# Patient Record
Sex: FEMALE | Race: WHITE | NOT HISPANIC OR LATINO
[De-identification: names, ages, dates, MRNs, and addresses within clinical notes are randomized per-mention and may not be internally consistent; named-entity substitution may affect disease eponyms.]

---

## 2017-02-27 ENCOUNTER — RX RENEWAL (OUTPATIENT)
Age: 42
End: 2017-02-27

## 2017-04-12 ENCOUNTER — APPOINTMENT (OUTPATIENT)
Dept: INTERNAL MEDICINE | Facility: CLINIC | Age: 42
End: 2017-04-12

## 2017-04-12 VITALS
BODY MASS INDEX: 19.14 KG/M2 | SYSTOLIC BLOOD PRESSURE: 110 MMHG | WEIGHT: 108 LBS | HEIGHT: 63 IN | DIASTOLIC BLOOD PRESSURE: 80 MMHG

## 2017-04-12 DIAGNOSIS — Z87.09 PERSONAL HISTORY OF OTHER DISEASES OF THE RESPIRATORY SYSTEM: ICD-10-CM

## 2017-04-12 DIAGNOSIS — L91.0 HYPERTROPHIC SCAR: ICD-10-CM

## 2017-04-13 LAB
ALBUMIN SERPL ELPH-MCNC: 4.4 G/DL
ALP BLD-CCNC: 49 U/L
ALT SERPL-CCNC: 13 U/L
ANION GAP SERPL CALC-SCNC: 12 MMOL/L
AST SERPL-CCNC: 19 U/L
BASOPHILS # BLD AUTO: 0.03 K/UL
BASOPHILS NFR BLD AUTO: 0.5 %
BILIRUB SERPL-MCNC: 0.2 MG/DL
BUN SERPL-MCNC: 11 MG/DL
CALCIUM SERPL-MCNC: 9.6 MG/DL
CHLORIDE SERPL-SCNC: 102 MMOL/L
CO2 SERPL-SCNC: 27 MMOL/L
CREAT SERPL-MCNC: 0.84 MG/DL
EOSINOPHIL # BLD AUTO: 0.09 K/UL
EOSINOPHIL NFR BLD AUTO: 1.5 %
GLUCOSE SERPL-MCNC: 86 MG/DL
HCT VFR BLD CALC: 38.8 %
HGB BLD-MCNC: 12.1 G/DL
IMM GRANULOCYTES NFR BLD AUTO: 0.2 %
IRON SATN MFR SERPL: 7 %
IRON SERPL-MCNC: 31 UG/DL
LYMPHOCYTES # BLD AUTO: 1.63 K/UL
LYMPHOCYTES NFR BLD AUTO: 27 %
MAN DIFF?: NORMAL
MCHC RBC-ENTMCNC: 26.5 PG
MCHC RBC-ENTMCNC: 31.2 GM/DL
MCV RBC AUTO: 85.1 FL
MONOCYTES # BLD AUTO: 0.43 K/UL
MONOCYTES NFR BLD AUTO: 7.1 %
NEUTROPHILS # BLD AUTO: 3.84 K/UL
NEUTROPHILS NFR BLD AUTO: 63.7 %
PLATELET # BLD AUTO: 287 K/UL
POTASSIUM SERPL-SCNC: 4.8 MMOL/L
PROT SERPL-MCNC: 6.8 G/DL
RBC # BLD: 4.56 M/UL
RBC # FLD: 14 %
SODIUM SERPL-SCNC: 141 MMOL/L
TIBC SERPL-MCNC: 439 UG/DL
TSH SERPL-ACNC: 1.96 UIU/ML
UIBC SERPL-MCNC: 408 UG/DL
WBC # FLD AUTO: 6.03 K/UL

## 2017-06-09 ENCOUNTER — APPOINTMENT (OUTPATIENT)
Dept: DERMATOLOGY | Facility: CLINIC | Age: 42
End: 2017-06-09

## 2017-06-09 VITALS
DIASTOLIC BLOOD PRESSURE: 70 MMHG | SYSTOLIC BLOOD PRESSURE: 104 MMHG | HEIGHT: 63 IN | BODY MASS INDEX: 19.14 KG/M2 | WEIGHT: 108 LBS

## 2017-06-09 DIAGNOSIS — Z87.442 PERSONAL HISTORY OF URINARY CALCULI: ICD-10-CM

## 2017-06-09 DIAGNOSIS — H54.7 UNSPECIFIED VISUAL LOSS: ICD-10-CM

## 2017-06-09 DIAGNOSIS — Z78.9 OTHER SPECIFIED HEALTH STATUS: ICD-10-CM

## 2017-06-09 DIAGNOSIS — I78.1 NEVUS, NON-NEOPLASTIC: ICD-10-CM

## 2017-06-09 DIAGNOSIS — J30.1 ALLERGIC RHINITIS DUE TO POLLEN: ICD-10-CM

## 2017-06-09 DIAGNOSIS — Z12.83 ENCOUNTER FOR SCREENING FOR MALIGNANT NEOPLASM OF SKIN: ICD-10-CM

## 2017-06-09 DIAGNOSIS — L91.0 HYPERTROPHIC SCAR: ICD-10-CM

## 2017-06-09 RX ORDER — FLUTICASONE PROPIONATE 50 MCG
SPRAY, SUSPENSION NASAL
Refills: 0 | Status: ACTIVE | COMMUNITY

## 2017-06-20 ENCOUNTER — FORM ENCOUNTER (OUTPATIENT)
Age: 42
End: 2017-06-20

## 2017-06-21 ENCOUNTER — APPOINTMENT (OUTPATIENT)
Dept: MAMMOGRAPHY | Facility: IMAGING CENTER | Age: 42
End: 2017-06-21

## 2017-06-21 ENCOUNTER — APPOINTMENT (OUTPATIENT)
Dept: ULTRASOUND IMAGING | Facility: IMAGING CENTER | Age: 42
End: 2017-06-21

## 2017-06-21 ENCOUNTER — OUTPATIENT (OUTPATIENT)
Dept: OUTPATIENT SERVICES | Facility: HOSPITAL | Age: 42
LOS: 1 days | End: 2017-06-21
Payer: COMMERCIAL

## 2017-06-21 DIAGNOSIS — Z98.89 OTHER SPECIFIED POSTPROCEDURAL STATES: Chronic | ICD-10-CM

## 2017-06-21 DIAGNOSIS — Z00.8 ENCOUNTER FOR OTHER GENERAL EXAMINATION: ICD-10-CM

## 2017-06-21 PROCEDURE — 76641 ULTRASOUND BREAST COMPLETE: CPT

## 2017-06-21 PROCEDURE — 77067 SCR MAMMO BI INCL CAD: CPT

## 2017-06-21 PROCEDURE — 77063 BREAST TOMOSYNTHESIS BI: CPT

## 2017-06-26 DIAGNOSIS — N60.11 DIFFUSE CYSTIC MASTOPATHY OF RIGHT BREAST: ICD-10-CM

## 2017-06-26 DIAGNOSIS — N60.12 DIFFUSE CYSTIC MASTOPATHY OF LEFT BREAST: ICD-10-CM

## 2017-06-26 DIAGNOSIS — Z12.31 ENCOUNTER FOR SCREENING MAMMOGRAM FOR MALIGNANT NEOPLASM OF BREAST: ICD-10-CM

## 2017-06-26 DIAGNOSIS — Z80.3 FAMILY HISTORY OF MALIGNANT NEOPLASM OF BREAST: ICD-10-CM

## 2017-08-29 ENCOUNTER — RX RENEWAL (OUTPATIENT)
Age: 42
End: 2017-08-29

## 2017-10-16 ENCOUNTER — TRANSCRIPTION ENCOUNTER (OUTPATIENT)
Age: 42
End: 2017-10-16

## 2018-02-27 ENCOUNTER — FORM ENCOUNTER (OUTPATIENT)
Age: 43
End: 2018-02-27

## 2018-02-28 ENCOUNTER — OUTPATIENT (OUTPATIENT)
Dept: OUTPATIENT SERVICES | Facility: HOSPITAL | Age: 43
LOS: 1 days | End: 2018-02-28
Payer: COMMERCIAL

## 2018-02-28 ENCOUNTER — APPOINTMENT (OUTPATIENT)
Dept: ULTRASOUND IMAGING | Facility: IMAGING CENTER | Age: 43
End: 2018-02-28
Payer: COMMERCIAL

## 2018-02-28 DIAGNOSIS — Z98.89 OTHER SPECIFIED POSTPROCEDURAL STATES: Chronic | ICD-10-CM

## 2018-02-28 DIAGNOSIS — Z00.00 ENCOUNTER FOR GENERAL ADULT MEDICAL EXAMINATION WITHOUT ABNORMAL FINDINGS: ICD-10-CM

## 2018-02-28 PROCEDURE — 76641 ULTRASOUND BREAST COMPLETE: CPT

## 2018-02-28 PROCEDURE — 76642 ULTRASOUND BREAST LIMITED: CPT | Mod: 26,RT

## 2018-02-28 PROCEDURE — 76642 ULTRASOUND BREAST LIMITED: CPT

## 2018-02-28 PROCEDURE — 76641 ULTRASOUND BREAST COMPLETE: CPT | Mod: 26,RT

## 2018-03-01 ENCOUNTER — RX RENEWAL (OUTPATIENT)
Age: 43
End: 2018-03-01

## 2018-03-02 ENCOUNTER — RX RENEWAL (OUTPATIENT)
Age: 43
End: 2018-03-02

## 2018-06-13 ENCOUNTER — APPOINTMENT (OUTPATIENT)
Dept: INTERNAL MEDICINE | Facility: CLINIC | Age: 43
End: 2018-06-13
Payer: COMMERCIAL

## 2018-06-13 VITALS
WEIGHT: 110 LBS | HEART RATE: 90 BPM | DIASTOLIC BLOOD PRESSURE: 60 MMHG | SYSTOLIC BLOOD PRESSURE: 102 MMHG | BODY MASS INDEX: 19.49 KG/M2 | HEIGHT: 63 IN

## 2018-06-13 PROCEDURE — 99396 PREV VISIT EST AGE 40-64: CPT

## 2018-06-14 LAB
25(OH)D3 SERPL-MCNC: 50.4 NG/ML
ALBUMIN SERPL ELPH-MCNC: 4.5 G/DL
ALP BLD-CCNC: 47 U/L
ALT SERPL-CCNC: 15 U/L
ANION GAP SERPL CALC-SCNC: 14 MMOL/L
AST SERPL-CCNC: 18 U/L
BASOPHILS # BLD AUTO: 0.02 K/UL
BASOPHILS NFR BLD AUTO: 0.4 %
BILIRUB SERPL-MCNC: 0.2 MG/DL
BUN SERPL-MCNC: 13 MG/DL
CALCIUM SERPL-MCNC: 9.6 MG/DL
CHLORIDE SERPL-SCNC: 102 MMOL/L
CHOLEST SERPL-MCNC: 161 MG/DL
CHOLEST/HDLC SERPL: 2.6 RATIO
CO2 SERPL-SCNC: 25 MMOL/L
CREAT SERPL-MCNC: 0.86 MG/DL
EOSINOPHIL # BLD AUTO: 0.04 K/UL
EOSINOPHIL NFR BLD AUTO: 0.7 %
FERRITIN SERPL-MCNC: 5 NG/ML
FOLATE SERPL-MCNC: >20 NG/ML
GLUCOSE SERPL-MCNC: 90 MG/DL
HBA1C MFR BLD HPLC: 5.4 %
HCT VFR BLD CALC: 35.3 %
HDLC SERPL-MCNC: 61 MG/DL
HGB BLD-MCNC: 11.2 G/DL
IMM GRANULOCYTES NFR BLD AUTO: 0.2 %
IRON SATN MFR SERPL: 6 %
IRON SERPL-MCNC: 24 UG/DL
LDLC SERPL CALC-MCNC: 91 MG/DL
LYMPHOCYTES # BLD AUTO: 1.36 K/UL
LYMPHOCYTES NFR BLD AUTO: 24 %
MAN DIFF?: NORMAL
MCHC RBC-ENTMCNC: 25.9 PG
MCHC RBC-ENTMCNC: 31.7 GM/DL
MCV RBC AUTO: 81.5 FL
MONOCYTES # BLD AUTO: 0.36 K/UL
MONOCYTES NFR BLD AUTO: 6.3 %
NEUTROPHILS # BLD AUTO: 3.88 K/UL
NEUTROPHILS NFR BLD AUTO: 68.4 %
PLATELET # BLD AUTO: 242 K/UL
POTASSIUM SERPL-SCNC: 4.6 MMOL/L
PROT SERPL-MCNC: 6.8 G/DL
RBC # BLD: 4.33 M/UL
RBC # FLD: 15.1 %
SODIUM SERPL-SCNC: 141 MMOL/L
TIBC SERPL-MCNC: 432 UG/DL
TRIGL SERPL-MCNC: 45 MG/DL
TSH SERPL-ACNC: 1.62 UIU/ML
UIBC SERPL-MCNC: 408 UG/DL
VIT B12 SERPL-MCNC: 537 PG/ML
WBC # FLD AUTO: 5.67 K/UL

## 2018-12-22 ENCOUNTER — TRANSCRIPTION ENCOUNTER (OUTPATIENT)
Age: 43
End: 2018-12-22

## 2019-02-04 ENCOUNTER — MESSAGE (OUTPATIENT)
Age: 44
End: 2019-02-04

## 2019-03-06 ENCOUNTER — RX RENEWAL (OUTPATIENT)
Age: 44
End: 2019-03-06

## 2019-03-13 ENCOUNTER — APPOINTMENT (OUTPATIENT)
Dept: OBGYN | Facility: CLINIC | Age: 44
End: 2019-03-13
Payer: COMMERCIAL

## 2019-03-13 VITALS
SYSTOLIC BLOOD PRESSURE: 113 MMHG | WEIGHT: 114.25 LBS | BODY MASS INDEX: 20.24 KG/M2 | HEIGHT: 63 IN | DIASTOLIC BLOOD PRESSURE: 71 MMHG

## 2019-03-13 PROCEDURE — 99396 PREV VISIT EST AGE 40-64: CPT

## 2019-03-13 RX ORDER — AMOXICILLIN 875 MG/1
875 TABLET, FILM COATED ORAL
Qty: 14 | Refills: 0 | Status: DISCONTINUED | COMMUNITY
Start: 2017-04-12 | End: 2019-03-13

## 2019-03-13 NOTE — HISTORY OF PRESENT ILLNESS
[Last Mammogram ___] : Last Mammogram was [unfilled] [Last Pap ___] : Last cervical pap smear was [unfilled] [Regular Cycle Intervals] : periods have been regular [Sexually Active] : is sexually active [Monogamous] : is monogamous [Contraception] : uses contraception [Condoms] : uses condoms

## 2019-03-15 LAB — HPV HIGH+LOW RISK DNA PNL CVX: NOT DETECTED

## 2019-03-19 LAB — CYTOLOGY CVX/VAG DOC THIN PREP: NORMAL

## 2019-03-31 ENCOUNTER — FORM ENCOUNTER (OUTPATIENT)
Age: 44
End: 2019-03-31

## 2019-04-01 ENCOUNTER — APPOINTMENT (OUTPATIENT)
Dept: MAMMOGRAPHY | Facility: IMAGING CENTER | Age: 44
End: 2019-04-01
Payer: COMMERCIAL

## 2019-04-01 ENCOUNTER — APPOINTMENT (OUTPATIENT)
Dept: ULTRASOUND IMAGING | Facility: IMAGING CENTER | Age: 44
End: 2019-04-01
Payer: COMMERCIAL

## 2019-04-01 ENCOUNTER — OUTPATIENT (OUTPATIENT)
Dept: OUTPATIENT SERVICES | Facility: HOSPITAL | Age: 44
LOS: 1 days | End: 2019-04-01
Payer: COMMERCIAL

## 2019-04-01 DIAGNOSIS — Z98.89 OTHER SPECIFIED POSTPROCEDURAL STATES: Chronic | ICD-10-CM

## 2019-04-01 DIAGNOSIS — Z01.419 ENCOUNTER FOR GYNECOLOGICAL EXAMINATION (GENERAL) (ROUTINE) WITHOUT ABNORMAL FINDINGS: ICD-10-CM

## 2019-04-01 PROCEDURE — 77063 BREAST TOMOSYNTHESIS BI: CPT | Mod: 26

## 2019-04-01 PROCEDURE — 77063 BREAST TOMOSYNTHESIS BI: CPT

## 2019-04-01 PROCEDURE — 76641 ULTRASOUND BREAST COMPLETE: CPT

## 2019-04-01 PROCEDURE — 77067 SCR MAMMO BI INCL CAD: CPT

## 2019-04-01 PROCEDURE — 77067 SCR MAMMO BI INCL CAD: CPT | Mod: 26

## 2019-04-01 PROCEDURE — 76641 ULTRASOUND BREAST COMPLETE: CPT | Mod: 26,50

## 2019-06-17 ENCOUNTER — APPOINTMENT (OUTPATIENT)
Dept: INTERNAL MEDICINE | Facility: CLINIC | Age: 44
End: 2019-06-17
Payer: COMMERCIAL

## 2019-06-17 ENCOUNTER — RX RENEWAL (OUTPATIENT)
Age: 44
End: 2019-06-17

## 2019-06-17 VITALS
DIASTOLIC BLOOD PRESSURE: 60 MMHG | OXYGEN SATURATION: 97 % | SYSTOLIC BLOOD PRESSURE: 102 MMHG | BODY MASS INDEX: 19.31 KG/M2 | WEIGHT: 109 LBS | HEIGHT: 63 IN | HEART RATE: 77 BPM

## 2019-06-17 PROCEDURE — 99396 PREV VISIT EST AGE 40-64: CPT

## 2019-06-18 LAB
ALBUMIN SERPL ELPH-MCNC: 4.5 G/DL
ALP BLD-CCNC: 42 U/L
ALT SERPL-CCNC: 12 U/L
ANION GAP SERPL CALC-SCNC: 12 MMOL/L
AST SERPL-CCNC: 12 U/L
BASOPHILS # BLD AUTO: 0.04 K/UL
BASOPHILS NFR BLD AUTO: 1.1 %
BILIRUB SERPL-MCNC: 0.3 MG/DL
BUN SERPL-MCNC: 10 MG/DL
CALCIUM SERPL-MCNC: 9.2 MG/DL
CHLORIDE SERPL-SCNC: 103 MMOL/L
CHOLEST SERPL-MCNC: 165 MG/DL
CHOLEST/HDLC SERPL: 2.6 RATIO
CO2 SERPL-SCNC: 25 MMOL/L
CREAT SERPL-MCNC: 0.82 MG/DL
EOSINOPHIL # BLD AUTO: 0.07 K/UL
EOSINOPHIL NFR BLD AUTO: 1.9 %
ESTIMATED AVERAGE GLUCOSE: 103 MG/DL
GLUCOSE SERPL-MCNC: 93 MG/DL
HBA1C MFR BLD HPLC: 5.2 %
HCT VFR BLD CALC: 34.7 %
HDLC SERPL-MCNC: 64 MG/DL
HGB BLD-MCNC: 10.6 G/DL
IMM GRANULOCYTES NFR BLD AUTO: 0.3 %
IRON SATN MFR SERPL: 4 %
IRON SERPL-MCNC: 23 UG/DL
LDLC SERPL CALC-MCNC: 92 MG/DL
LYMPHOCYTES # BLD AUTO: 1.66 K/UL
LYMPHOCYTES NFR BLD AUTO: 44.3 %
MAN DIFF?: NORMAL
MCHC RBC-ENTMCNC: 24.6 PG
MCHC RBC-ENTMCNC: 30.5 GM/DL
MCV RBC AUTO: 80.5 FL
MONOCYTES # BLD AUTO: 0.35 K/UL
MONOCYTES NFR BLD AUTO: 9.3 %
NEUTROPHILS # BLD AUTO: 1.62 K/UL
NEUTROPHILS NFR BLD AUTO: 43.1 %
PLATELET # BLD AUTO: 231 K/UL
POTASSIUM SERPL-SCNC: 4.5 MMOL/L
PROT SERPL-MCNC: 6.5 G/DL
RBC # BLD: 4.31 M/UL
RBC # FLD: 17.1 %
SODIUM SERPL-SCNC: 140 MMOL/L
TIBC SERPL-MCNC: 516 UG/DL
TRIGL SERPL-MCNC: 47 MG/DL
TSH SERPL-ACNC: 1.65 UIU/ML
UIBC SERPL-MCNC: 493 UG/DL
WBC # FLD AUTO: 3.75 K/UL

## 2019-09-30 ENCOUNTER — CLINICAL ADVICE (OUTPATIENT)
Age: 44
End: 2019-09-30

## 2019-11-26 ENCOUNTER — FORM ENCOUNTER (OUTPATIENT)
Age: 44
End: 2019-11-26

## 2019-11-27 ENCOUNTER — APPOINTMENT (OUTPATIENT)
Dept: ULTRASOUND IMAGING | Facility: IMAGING CENTER | Age: 44
End: 2019-11-27
Payer: COMMERCIAL

## 2019-11-27 ENCOUNTER — OUTPATIENT (OUTPATIENT)
Dept: OUTPATIENT SERVICES | Facility: HOSPITAL | Age: 44
LOS: 1 days | End: 2019-11-27
Payer: COMMERCIAL

## 2019-11-27 ENCOUNTER — APPOINTMENT (OUTPATIENT)
Dept: MAMMOGRAPHY | Facility: IMAGING CENTER | Age: 44
End: 2019-11-27
Payer: COMMERCIAL

## 2019-11-27 DIAGNOSIS — Z98.89 OTHER SPECIFIED POSTPROCEDURAL STATES: Chronic | ICD-10-CM

## 2019-11-27 DIAGNOSIS — R92.8 OTHER ABNORMAL AND INCONCLUSIVE FINDINGS ON DIAGNOSTIC IMAGING OF BREAST: ICD-10-CM

## 2019-11-27 PROCEDURE — 77065 DX MAMMO INCL CAD UNI: CPT | Mod: 26,RT

## 2019-11-27 PROCEDURE — 77065 DX MAMMO INCL CAD UNI: CPT

## 2019-11-30 ENCOUNTER — TRANSCRIPTION ENCOUNTER (OUTPATIENT)
Age: 44
End: 2019-11-30

## 2019-12-04 ENCOUNTER — FORM ENCOUNTER (OUTPATIENT)
Age: 44
End: 2019-12-04

## 2019-12-05 ENCOUNTER — APPOINTMENT (OUTPATIENT)
Dept: MAMMOGRAPHY | Facility: IMAGING CENTER | Age: 44
End: 2019-12-05
Payer: COMMERCIAL

## 2019-12-05 ENCOUNTER — OUTPATIENT (OUTPATIENT)
Dept: OUTPATIENT SERVICES | Facility: HOSPITAL | Age: 44
LOS: 1 days | End: 2019-12-05
Payer: COMMERCIAL

## 2019-12-05 ENCOUNTER — RESULT REVIEW (OUTPATIENT)
Age: 44
End: 2019-12-05

## 2019-12-05 DIAGNOSIS — R92.1 MAMMOGRAPHIC CALCIFICATION FOUND ON DIAGNOSTIC IMAGING OF BREAST: ICD-10-CM

## 2019-12-05 DIAGNOSIS — Z98.89 OTHER SPECIFIED POSTPROCEDURAL STATES: Chronic | ICD-10-CM

## 2019-12-05 PROCEDURE — 19082 BX BREAST ADD LESION STRTCTC: CPT

## 2019-12-05 PROCEDURE — 88305 TISSUE EXAM BY PATHOLOGIST: CPT | Mod: 26

## 2019-12-05 PROCEDURE — 77065 DX MAMMO INCL CAD UNI: CPT

## 2019-12-05 PROCEDURE — A4648: CPT

## 2019-12-05 PROCEDURE — 19081 BX BREAST 1ST LESION STRTCTC: CPT | Mod: RT

## 2019-12-05 PROCEDURE — 19082 BX BREAST ADD LESION STRTCTC: CPT | Mod: RT

## 2019-12-05 PROCEDURE — 77065 DX MAMMO INCL CAD UNI: CPT | Mod: 26,RT

## 2019-12-05 PROCEDURE — 88305 TISSUE EXAM BY PATHOLOGIST: CPT

## 2019-12-05 PROCEDURE — 19081 BX BREAST 1ST LESION STRTCTC: CPT

## 2020-01-14 ENCOUNTER — FORM ENCOUNTER (OUTPATIENT)
Age: 45
End: 2020-01-14

## 2020-01-15 ENCOUNTER — OUTPATIENT (OUTPATIENT)
Dept: OUTPATIENT SERVICES | Facility: HOSPITAL | Age: 45
LOS: 1 days | End: 2020-01-15
Payer: COMMERCIAL

## 2020-01-15 ENCOUNTER — RESULT REVIEW (OUTPATIENT)
Age: 45
End: 2020-01-15

## 2020-01-15 ENCOUNTER — APPOINTMENT (OUTPATIENT)
Dept: MAMMOGRAPHY | Facility: IMAGING CENTER | Age: 45
End: 2020-01-15
Payer: COMMERCIAL

## 2020-01-15 DIAGNOSIS — R92.8 OTHER ABNORMAL AND INCONCLUSIVE FINDINGS ON DIAGNOSTIC IMAGING OF BREAST: ICD-10-CM

## 2020-01-15 DIAGNOSIS — Z98.89 OTHER SPECIFIED POSTPROCEDURAL STATES: Chronic | ICD-10-CM

## 2020-01-15 PROCEDURE — 88305 TISSUE EXAM BY PATHOLOGIST: CPT

## 2020-01-15 PROCEDURE — 77065 DX MAMMO INCL CAD UNI: CPT

## 2020-01-15 PROCEDURE — 19081 BX BREAST 1ST LESION STRTCTC: CPT | Mod: RT

## 2020-01-15 PROCEDURE — 88305 TISSUE EXAM BY PATHOLOGIST: CPT | Mod: 26

## 2020-01-15 PROCEDURE — 77065 DX MAMMO INCL CAD UNI: CPT | Mod: 26,RT

## 2020-01-15 PROCEDURE — 19081 BX BREAST 1ST LESION STRTCTC: CPT

## 2020-01-17 LAB — SURGICAL PATHOLOGY STUDY: SIGNIFICANT CHANGE UP

## 2020-02-10 ENCOUNTER — APPOINTMENT (OUTPATIENT)
Dept: SURGICAL ONCOLOGY | Facility: CLINIC | Age: 45
End: 2020-02-10
Payer: COMMERCIAL

## 2020-02-10 VITALS
HEIGHT: 63 IN | DIASTOLIC BLOOD PRESSURE: 68 MMHG | HEART RATE: 90 BPM | SYSTOLIC BLOOD PRESSURE: 110 MMHG | WEIGHT: 110 LBS | BODY MASS INDEX: 19.49 KG/M2 | OXYGEN SATURATION: 97 % | RESPIRATION RATE: 16 BRPM

## 2020-02-10 DIAGNOSIS — Z85.828 PERSONAL HISTORY OF OTHER MALIGNANT NEOPLASM OF SKIN: ICD-10-CM

## 2020-02-10 PROCEDURE — 99245 OFF/OP CONSLTJ NEW/EST HI 55: CPT

## 2020-02-10 NOTE — PHYSICAL EXAM
[Normal] : supple, no neck mass and thyroid not enlarged [Normal Neck Lymph Nodes] : normal neck lymph nodes  [Normal Supraclavicular Lymph Nodes] : normal supraclavicular lymph nodes [Normal Axillary Lymph Nodes] : normal axillary lymph nodes [Normal] : oriented to person, place and time, with appropriate affect [de-identified] : Dense; no palpable breast masses or nipple discharge

## 2020-02-10 NOTE — CONSULT LETTER
[Dear  ___] : Dear  [unfilled], [Consult Letter:] : I had the pleasure of evaluating your patient, [unfilled]. [Please see my note below.] : Please see my note below. [Consult Closing:] : Thank you very much for allowing me to participate in the care of this patient.  If you have any questions, please do not hesitate to contact me. [Sincerely,] : Sincerely, [FreeTextEntry1] : I will keep you informed of the MRI results and my subsequent plans. [FreeTextEntry3] : Gonzalo Mallory MD FACS\par Chief of Surgical Oncology\par \par  [DrMarry  ___] : Dr. CHEN

## 2020-02-10 NOTE — ASSESSMENT
[FreeTextEntry1] : IMP:\par Right breast flat epithelial atypia and LCIS\par Mother with breast cancer twice \par \par PLAN:\par Breast MRI\par Genetic testing ( pt. to check if mother was tested 0\par Right breast zahida- localized lumpectomy if MRI is otherwise negative ( Tophat -upper central clip )

## 2020-02-10 NOTE — HISTORY OF PRESENT ILLNESS
[de-identified] : Ms. DESTINY MONTES DE OCA  is a 44 year  old female  presenting for an initial consultation for newly diagnosed right breast flat epithelial atypia, LCIS and PASH. \par \par She underwent a screening mammogram/sonogram in 2019 which revealed probably benign new clusters of calcifications in the right upper and right upper outer breast for which a six month mammogram was recommended, negative sonogram (BIRADS 3).    Subsequently, she underwent a f/u diagnostic right mammogram on 19 showing extremely dense breasts, a grouping of indeterminate calcifications in the central outer aspect middle depth for which a stereotactic core biopsy was recommended, multiple newly seen fine indeterminate calcifications in the upper outer posterior right breast for which stereotactic core biopsy is recommended, small grouping of calcifications in the upper central aspect middle-posterior depth which are without significant change (BIRADS 4B).  \par \par She is s/p right breast x 2 site stereotactic core biopsy on 19 with the following pathology:\par **1) Right breast upper outer posterior quadrant stereo bx-  Proliferative and nonproliferative fibrocystic change with dense stromal fibrosis, extensive columnar cell change, PASH, apocrine metaplasia and microcyst formation. \par ***2) Right breast central outer quadrant stereo bx-  LCIS and flat epithelial atypia.  Proliferative and nonproliferative fibrocystic change with dense stromal fibrosis, extensive columnar cell change, PASH, multiple foci of microcalcifications. \par ( HIGH RISK AND CONCORDANT)\par \par She is s/p right upper central breast stereotactic core biopsy on 1/15/2020.  Pathology revealed proliferative and nonproliferative fibrocystic disease including columnar cell hyperplasia, adenosis, microcysts and fibrosis.  (Results are BENIGN AND CONCORDANT). \par \par Family history of breast cancer involving her mother @ age 50 and then again @ age 60.  Her mother  from leukemia/lymphoma. She is unsure whether her mom was BRCA negative. \par Denies palpable breast masses or nipple discharge\par Menarche age: 12 y/o\par LMP: 2020\par  (age at first pregnancy- 21 y/o)\par Oral contraceptives x 15 years \par \par PMH: Knee surgery , C-sections (, , ), BCC\par

## 2020-02-10 NOTE — REASON FOR VISIT
[Initial Consultation] : an initial consultation for [FreeTextEntry2] : Right breast flat epithelial atypia, LCIS and PASH

## 2020-03-08 ENCOUNTER — FORM ENCOUNTER (OUTPATIENT)
Age: 45
End: 2020-03-08

## 2020-03-09 ENCOUNTER — OUTPATIENT (OUTPATIENT)
Dept: OUTPATIENT SERVICES | Facility: HOSPITAL | Age: 45
LOS: 1 days | End: 2020-03-09
Payer: COMMERCIAL

## 2020-03-09 ENCOUNTER — APPOINTMENT (OUTPATIENT)
Dept: MRI IMAGING | Facility: IMAGING CENTER | Age: 45
End: 2020-03-09
Payer: COMMERCIAL

## 2020-03-09 DIAGNOSIS — Z98.89 OTHER SPECIFIED POSTPROCEDURAL STATES: Chronic | ICD-10-CM

## 2020-03-09 DIAGNOSIS — D05.01 LOBULAR CARCINOMA IN SITU OF RIGHT BREAST: ICD-10-CM

## 2020-03-09 PROCEDURE — C8908: CPT

## 2020-03-09 PROCEDURE — A9585: CPT

## 2020-03-09 PROCEDURE — C8937: CPT

## 2020-03-09 PROCEDURE — 77049 MRI BREAST C-+ W/CAD BI: CPT | Mod: 26

## 2020-03-16 ENCOUNTER — RX RENEWAL (OUTPATIENT)
Age: 45
End: 2020-03-16

## 2020-05-26 ENCOUNTER — OUTPATIENT (OUTPATIENT)
Dept: OUTPATIENT SERVICES | Facility: HOSPITAL | Age: 45
LOS: 1 days | End: 2020-05-26
Payer: COMMERCIAL

## 2020-05-26 DIAGNOSIS — Z98.89 OTHER SPECIFIED POSTPROCEDURAL STATES: Chronic | ICD-10-CM

## 2020-05-26 DIAGNOSIS — Z11.59 ENCOUNTER FOR SCREENING FOR OTHER VIRAL DISEASES: ICD-10-CM

## 2020-05-26 PROCEDURE — U0003: CPT

## 2020-05-27 ENCOUNTER — APPOINTMENT (OUTPATIENT)
Dept: MAMMOGRAPHY | Facility: IMAGING CENTER | Age: 45
End: 2020-05-27
Payer: COMMERCIAL

## 2020-05-27 ENCOUNTER — OUTPATIENT (OUTPATIENT)
Dept: OUTPATIENT SERVICES | Facility: HOSPITAL | Age: 45
LOS: 1 days | End: 2020-05-27
Payer: COMMERCIAL

## 2020-05-27 ENCOUNTER — TRANSCRIPTION ENCOUNTER (OUTPATIENT)
Age: 45
End: 2020-05-27

## 2020-05-27 ENCOUNTER — RESULT REVIEW (OUTPATIENT)
Age: 45
End: 2020-05-27

## 2020-05-27 DIAGNOSIS — Z00.8 ENCOUNTER FOR OTHER GENERAL EXAMINATION: ICD-10-CM

## 2020-05-27 DIAGNOSIS — Z98.89 OTHER SPECIFIED POSTPROCEDURAL STATES: Chronic | ICD-10-CM

## 2020-05-27 LAB — SARS-COV-2 RNA SPEC QL NAA+PROBE: SIGNIFICANT CHANGE UP

## 2020-05-27 PROCEDURE — 19281 PERQ DEVICE BREAST 1ST IMAG: CPT | Mod: RT

## 2020-05-27 PROCEDURE — C1739: CPT

## 2020-05-27 PROCEDURE — 19281 PERQ DEVICE BREAST 1ST IMAG: CPT

## 2020-05-28 ENCOUNTER — RESULT REVIEW (OUTPATIENT)
Age: 45
End: 2020-05-28

## 2020-05-28 ENCOUNTER — APPOINTMENT (OUTPATIENT)
Dept: SURGICAL ONCOLOGY | Facility: HOSPITAL | Age: 45
End: 2020-05-28

## 2020-05-28 ENCOUNTER — OUTPATIENT (OUTPATIENT)
Dept: OUTPATIENT SERVICES | Facility: HOSPITAL | Age: 45
LOS: 1 days | End: 2020-05-28
Payer: COMMERCIAL

## 2020-05-28 VITALS
SYSTOLIC BLOOD PRESSURE: 93 MMHG | HEIGHT: 63 IN | TEMPERATURE: 97 F | WEIGHT: 111.33 LBS | OXYGEN SATURATION: 100 % | HEART RATE: 68 BPM | DIASTOLIC BLOOD PRESSURE: 60 MMHG | RESPIRATION RATE: 18 BRPM

## 2020-05-28 VITALS
DIASTOLIC BLOOD PRESSURE: 62 MMHG | OXYGEN SATURATION: 96 % | SYSTOLIC BLOOD PRESSURE: 97 MMHG | RESPIRATION RATE: 12 BRPM | HEART RATE: 62 BPM

## 2020-05-28 DIAGNOSIS — Z29.9 ENCOUNTER FOR PROPHYLACTIC MEASURES, UNSPECIFIED: ICD-10-CM

## 2020-05-28 DIAGNOSIS — Z98.890 OTHER SPECIFIED POSTPROCEDURAL STATES: Chronic | ICD-10-CM

## 2020-05-28 DIAGNOSIS — J30.2 OTHER SEASONAL ALLERGIC RHINITIS: ICD-10-CM

## 2020-05-28 DIAGNOSIS — Z98.89 OTHER SPECIFIED POSTPROCEDURAL STATES: Chronic | ICD-10-CM

## 2020-05-28 DIAGNOSIS — C50.911 MALIGNANT NEOPLASM OF UNSPECIFIED SITE OF RIGHT FEMALE BREAST: ICD-10-CM

## 2020-05-28 LAB
ANION GAP SERPL CALC-SCNC: 12 MMOL/L — SIGNIFICANT CHANGE UP (ref 5–17)
BUN SERPL-MCNC: 10 MG/DL — SIGNIFICANT CHANGE UP (ref 7–23)
CALCIUM SERPL-MCNC: 9.4 MG/DL — SIGNIFICANT CHANGE UP (ref 8.4–10.5)
CHLORIDE SERPL-SCNC: 105 MMOL/L — SIGNIFICANT CHANGE UP (ref 96–108)
CO2 SERPL-SCNC: 24 MMOL/L — SIGNIFICANT CHANGE UP (ref 22–31)
CREAT SERPL-MCNC: 0.8 MG/DL — SIGNIFICANT CHANGE UP (ref 0.5–1.3)
GLUCOSE SERPL-MCNC: 101 MG/DL — HIGH (ref 70–99)
HCT VFR BLD CALC: 39.7 % — SIGNIFICANT CHANGE UP (ref 34.5–45)
HGB BLD-MCNC: 13 G/DL — SIGNIFICANT CHANGE UP (ref 11.5–15.5)
MCHC RBC-ENTMCNC: 28.4 PG — SIGNIFICANT CHANGE UP (ref 27–34)
MCHC RBC-ENTMCNC: 32.7 GM/DL — SIGNIFICANT CHANGE UP (ref 32–36)
MCV RBC AUTO: 86.7 FL — SIGNIFICANT CHANGE UP (ref 80–100)
NRBC # BLD: 0 /100 WBCS — SIGNIFICANT CHANGE UP (ref 0–0)
PLATELET # BLD AUTO: 221 K/UL — SIGNIFICANT CHANGE UP (ref 150–400)
POTASSIUM SERPL-MCNC: 3.5 MMOL/L — SIGNIFICANT CHANGE UP (ref 3.5–5.3)
POTASSIUM SERPL-SCNC: 3.5 MMOL/L — SIGNIFICANT CHANGE UP (ref 3.5–5.3)
RBC # BLD: 4.58 M/UL — SIGNIFICANT CHANGE UP (ref 3.8–5.2)
RBC # FLD: 13.3 % — SIGNIFICANT CHANGE UP (ref 10.3–14.5)
SODIUM SERPL-SCNC: 141 MMOL/L — SIGNIFICANT CHANGE UP (ref 135–145)
WBC # BLD: 5.42 K/UL — SIGNIFICANT CHANGE UP (ref 3.8–10.5)
WBC # FLD AUTO: 5.42 K/UL — SIGNIFICANT CHANGE UP (ref 3.8–10.5)

## 2020-05-28 PROCEDURE — 85027 COMPLETE CBC AUTOMATED: CPT

## 2020-05-28 PROCEDURE — 88307 TISSUE EXAM BY PATHOLOGIST: CPT

## 2020-05-28 PROCEDURE — 88307 TISSUE EXAM BY PATHOLOGIST: CPT | Mod: 26

## 2020-05-28 PROCEDURE — 19301 PARTIAL MASTECTOMY: CPT

## 2020-05-28 PROCEDURE — 88305 TISSUE EXAM BY PATHOLOGIST: CPT

## 2020-05-28 PROCEDURE — 88305 TISSUE EXAM BY PATHOLOGIST: CPT | Mod: 26

## 2020-05-28 PROCEDURE — 76098 X-RAY EXAM SURGICAL SPECIMEN: CPT

## 2020-05-28 PROCEDURE — 19366: CPT

## 2020-05-28 PROCEDURE — 19301 PARTIAL MASTECTOMY: CPT | Mod: RT

## 2020-05-28 PROCEDURE — 80048 BASIC METABOLIC PNL TOTAL CA: CPT

## 2020-05-28 PROCEDURE — 76098 X-RAY EXAM SURGICAL SPECIMEN: CPT | Mod: 26

## 2020-05-28 RX ORDER — CHLORHEXIDINE GLUCONATE 213 G/1000ML
1 SOLUTION TOPICAL ONCE
Refills: 0 | Status: COMPLETED | OUTPATIENT
Start: 2020-05-28 | End: 2020-05-28

## 2020-05-28 RX ORDER — CELECOXIB 200 MG/1
200 CAPSULE ORAL ONCE
Refills: 0 | Status: DISCONTINUED | OUTPATIENT
Start: 2020-05-28 | End: 2020-06-12

## 2020-05-28 RX ORDER — SODIUM CHLORIDE 9 MG/ML
1000 INJECTION, SOLUTION INTRAVENOUS
Refills: 0 | Status: DISCONTINUED | OUTPATIENT
Start: 2020-05-28 | End: 2020-06-12

## 2020-05-28 RX ORDER — OXYCODONE HYDROCHLORIDE 5 MG/1
5 TABLET ORAL ONCE
Refills: 0 | Status: DISCONTINUED | OUTPATIENT
Start: 2020-05-28 | End: 2020-05-28

## 2020-05-28 RX ORDER — SODIUM CHLORIDE 9 MG/ML
3 INJECTION INTRAMUSCULAR; INTRAVENOUS; SUBCUTANEOUS EVERY 8 HOURS
Refills: 0 | Status: DISCONTINUED | OUTPATIENT
Start: 2020-05-28 | End: 2020-06-12

## 2020-05-28 RX ORDER — LIDOCAINE HCL 20 MG/ML
0.2 VIAL (ML) INJECTION ONCE
Refills: 0 | Status: DISCONTINUED | OUTPATIENT
Start: 2020-05-28 | End: 2020-06-12

## 2020-05-28 RX ORDER — ONDANSETRON 8 MG/1
4 TABLET, FILM COATED ORAL ONCE
Refills: 0 | Status: DISCONTINUED | OUTPATIENT
Start: 2020-05-28 | End: 2020-06-12

## 2020-05-28 RX ADMIN — CHLORHEXIDINE GLUCONATE 1 APPLICATION(S): 213 SOLUTION TOPICAL at 08:10

## 2020-05-28 NOTE — H&P PST ADULT - ASSESSMENT
malignant neoplasm right female breast malignant neoplasm right female breast  HELENI VTE 2.0 SCORE [CLOT updated 2019]    AGE RELATED RISK FACTORS                                                       MOBILITY RELATED FACTORS  [ x] Age 41-60 years                                            (1 Point)                    [ ] Bed rest                                                        (1 Point)  [ ] Age: 61-74 years                                           (2 Points)                  [ ] Plaster cast                                                   (2 Points)  [ ] Age= 75 years                                              (3 Points)                    [ ] Bed bound for more than 72 hours                 (2 Points)    DISEASE RELATED RISK FACTORS                                               GENDER SPECIFIC FACTORS  [ ] Edema in the lower extremities                       (1 Point)              [ ] Pregnancy                                                     (1 Point)  [ ] Varicose veins                                               (1 Point)                     [ ] Post-partum < 6 weeks                                   (1 Point)             [ ] BMI > 25 Kg/m2                                            (1 Point)                     [ ] Hormonal therapy  or oral contraception          (1 Point)                 [ ] Sepsis (in the previous month)                        (1 Point)               [ ] History of pregnancy complications                 (1 point)  [ ] Pneumonia or serious lung disease                                               [ ] Unexplained or recurrent                     (1 Point)           (in the previous month)                               (1 Point)  [ ] Abnormal pulmonary function test                     (1 Point)                 SURGERY RELATED RISK FACTORS  [ ] Acute myocardial infarction                              (1 Point)               [ ]  Section                                             (1 Point)  [ ] Congestive heart failure (in the previous month)  (1 Point)      [ ] Minor surgery                                                  (1 Point)   [ ] Inflammatory bowel disease                             (1 Point)               [ ] Arthroscopic surgery                                        (2 Points)  [ ] Central venous access                                      (2 Points)                [x ] General surgery lasting more than 45 minutes (2 points)  [ ] Malignancy- Present or previous                   (2 Points)                [ ] Elective arthroplasty                                         (5 points)    [ ] Stroke (in the previous month)                          (5 Points)                                                                                                                                                           HEMATOLOGY RELATED FACTORS                                                 TRAUMA RELATED RISK FACTORS  [ ] Prior episodes of VTE                                     (3 Points)                [ ] Fracture of the hip, pelvis, or leg                       (5 Points)  [ ] Positive family history for VTE                         (3 Points)             [ ] Acute spinal cord injury (in the previous month)  (5 Points)  [ ] Prothrombin 81007 A                                     (3 Points)               [ ] Paralysis  (less than 1 month)                             (5 Points)  [ ] Factor V Leiden                                             (3 Points)                  [ ] Multiple Trauma within 1 month                        (5 Points)  [ ] Lupus anticoagulants                                     (3 Points)                                                           [ ] Anticardiolipin antibodies                               (3 Points)                                                       [ ] High homocysteine in the blood                      (3 Points)                                             [ ] Other congenital or acquired thrombophilia      (3 Points)                                                [ ] Heparin induced thrombocytopenia                  (3 Points)                                     Total Score [     3     ]

## 2020-05-28 NOTE — ASU DISCHARGE PLAN (ADULT/PEDIATRIC) - CARE PROVIDER_API CALL
Gonzalo Mallory  SURGERY  [address] [address] [place] [id number]  Phone: [phone]  Fax: [phone]  Follow Up Time: 2 weeks

## 2020-05-28 NOTE — H&P PST ADULT - NSICDXPROBLEM_GEN_ALL_CORE_FT
PROBLEM DIAGNOSES  Problem: Malignant neoplasm of right female breast  Assessment and Plan: right breast saviscout localized lumpectomy    Problem: Need for prophylactic measure  Assessment and Plan: PROBLEM DIAGNOSES  Problem: Seasonal allergies  Assessment and Plan: continue montelukast    Problem: Malignant neoplasm of right female breast  Assessment and Plan: right breast saviscout localized lumpectomy    Problem: Need for prophylactic measure  Assessment and Plan: The Caprini score indicates this patient is at risk for a VTE event (score 3-5).  Most surgical patients in this group would benefit from pharmacologic prophylaxis.  The surgical team will determine the balance between VTE risk and bleeding risk

## 2020-05-28 NOTE — ASU PREOP CHECKLIST - STERILIZATION AFFIRMATION
Online Visit    Date/Time: 8/14/2018 8:26:17 AM  To: [place]  From: Dudley Baez  Subject:    urine negative for bacteria- continue with antibiotics as prescribed  follow up if symptoms continue as discussed     Verified Results  URINALYSIS SCREEN with MICROSCOPIC IF INDICATED AND URINE CULTURE REFLEX [id number] 12:01AM HINK[place], [address]     Test Name Result Flag Reference   URINE COLOR POLY A YELLOW   APPEARANCE, URINE CLOUDY     URINE GLUCOSE NEGATIVE mg/dl  NEGATIVE   URINE BILIRUBIN NEGATIVE  NEGATIVE   KETONES TRACE mg/dl A NEGATIVE   URINE SPECIFIC GRAVITY 1.024  1.005-1.030   RBC-URINE NEGATIVE  NEGATIVE   PH-URINE 6.0 Units  5.0-7.0   URINE PROTEIN NEGATIVE mg/dl  NEGATIVE   UROBILINOGEN-URINE 2.0 mg/dl H 0.0-1.0   NITRITE NEGATIVE  NEGATIVE   WBC-URINE NEGATIVE  NEGATIVE   SPECIMEN TYPE      URINE, CLEAN CATCH/MIDSTREAM
n/a

## 2020-05-28 NOTE — ASU DISCHARGE PLAN (ADULT/PEDIATRIC) - NURSING INSTRUCTIONS
Next dose of Tylenol will be on or after ___337pm________ ,today/tonight, If needed for pain/cramps. Your first dose of Tylenol was given at _____937am______. Do not exceed more than 4000mg of Tylenol in one 24 hour setting.

## 2020-05-28 NOTE — ASU DISCHARGE PLAN (ADULT/PEDIATRIC) - CALL YOUR DOCTOR IF YOU HAVE ANY OF THE FOLLOWING:
Wound/Surgical Site with redness, or foul smelling discharge or pus/Numbness, tingling, color or temperature change to extremity/Pain not relieved by Medications/Swelling that gets worse/Fever greater than (need to indicate Fahrenheit or Celsius)/Bleeding that does not stop

## 2020-05-28 NOTE — ASU PATIENT PROFILE, ADULT - PSH
H/O arthroscopy of knee  right   S/P  section  , ,   S/P dilation and curettage  2008 H/O arthroscopy of knee  right 1987  History of elective     S/P  section  , ,   S/P dilation and curettage  2008

## 2020-05-28 NOTE — ASU DISCHARGE PLAN (ADULT/PEDIATRIC) - ASU DC SPECIAL INSTRUCTIONSFT
PAIN: You may continue to take Tylenol and Ibuprofen (Advil, Motrin) over the counter for pain.     WOUND CARE:  You should allow warm soapy water to run down the wound in the shower. You do not need to scrub the area. You do not have any stitches that need to be removed.    Please wear supportive bra/ bralette for 24 hours    BATHING: You may shower/sponge bathe 48 hours after your surgery date. Please do not soak or submerge the wound in water (bath, swimming) for 14 days after your surgery.    ACTIVITY: No heavy lifting, straining, or vigorous activity until your follow-up appointment in 2 weeks.     DIET: You may resume your regular diet.     NOTIFY YOUR US IF: You have any bleeding that does not stop, any pus draining from his/her wound(s), any fever (over 100.4 F) or chills, persistent nausea/vomiting, persistent diarrhea, or if pain is not controlled on discharge pain medications.    FOLLOW-UP: Please call the office and make an appointment to follow up with Dr. Mallory in 2 weeks.

## 2020-05-28 NOTE — H&P PST ADULT - HISTORY OF PRESENT ILLNESS
This is a 46 y/o female This is a 44 y/o female  upon  a routine mammogram + right breast mass, she presents today for surgery

## 2020-05-28 NOTE — ASU PREOP CHECKLIST - INTERNAL PROSTHESES
R titanium chips/yes(specify) yes(specify)/R titanium placement for procedure today from MD yesterday per pt

## 2020-05-28 NOTE — H&P PST ADULT - NSICDXPASTMEDICALHX_GEN_ALL_CORE_FT
PAST MEDICAL HISTORY:  Acute sinusitis     Seasonal allergies PAST MEDICAL HISTORY:  Acute sinusitis 1/2020    Seasonal allergies

## 2020-05-28 NOTE — H&P PST ADULT - NSICDXFAMILYHX_GEN_ALL_CORE_FT
FAMILY HISTORY:  Mother  Still living? Yes, Estimated age: 61-70  Family history of breast cancer, Age at diagnosis: Age Unknown

## 2020-05-28 NOTE — H&P PST ADULT - NSICDXPASTSURGICALHX_GEN_ALL_CORE_FT
PAST SURGICAL HISTORY:  H/O arthroscopy of knee right     S/P  section , ,  PAST SURGICAL HISTORY:  H/O arthroscopy of knee right     S/P  section , ,     S/P dilation and curettage 2008 PAST SURGICAL HISTORY:  H/O arthroscopy of knee right 1987    History of elective      S/P  section , ,     S/P dilation and curettage 2008

## 2020-06-09 ENCOUNTER — APPOINTMENT (OUTPATIENT)
Dept: SURGICAL ONCOLOGY | Facility: CLINIC | Age: 45
End: 2020-06-09
Payer: COMMERCIAL

## 2020-06-09 VITALS
HEART RATE: 71 BPM | SYSTOLIC BLOOD PRESSURE: 108 MMHG | DIASTOLIC BLOOD PRESSURE: 70 MMHG | TEMPERATURE: 98.3 F | OXYGEN SATURATION: 98 % | RESPIRATION RATE: 16 BRPM

## 2020-06-09 PROCEDURE — 99214 OFFICE O/P EST MOD 30 MIN: CPT | Mod: 24

## 2020-06-09 NOTE — ASSESSMENT
[FreeTextEntry1] : IMP:\par LCIS - excised with negative margins\par \par Plan:\par RTO q 6 months\par Bilateral mammogram and sonogram 11/20\par MRI q 2 years

## 2020-06-09 NOTE — RESULTS/DATA
[FreeTextEntry1] : DESTINY MONTES DE OCA W                    \par Surgical Final Report \par Final Diagnosis\par 1     Right breast cancer\par - Lobular carcinoma in situ, classic type (LCIS)\par - LCIS present in 14/18 slides\par - Columnar cell hyperplasia with microcalcifications\par - Flat epithelial atypia\par - Extensive usual ductal hyperplasia\par - Healing biopsy site changes (fat necrosis, fibroplasia,\par non specific-inflammation)\par - Pseudoangiomatous stromal hyperplasia (PASH)\par - Fibrocystic changes (apocrine metaplasia, stromal\par fibrosis)\par - Microcalcifications identified in bengin ducts and\par adenosis\par - Clip identified\par - Specimen radiograph reviewed\par \par 2     Superior margin right breast, excision\par - Columnar cell hyperplasia with microcalcifications\par \par 3     Medial margin right breast, excision\par - Columnar cell hyperplasia with microcalcifications\par \par 4     Inferior margin right breast, excision\par - Columnar cell hyperplasia with microcalcifications\par - Hemangioma\par - Atypical lobular hyperplasia\par \par 5     Lateral margin right breast, excision\par - Fibrocystic changes\par \par 6     Deep margin right breast, excision\par - Columnar cell hyperplasia with microcalcifications\par \par Verified by: ANI JEFFERY\par (Electronic Signature)\par Reported on: 06/01/20 10:40 EDT, [address] [facility]. [address],\[place] [address]\par Phone: [phone]   Fax: [phone]\par _________________________________________________________________

## 2020-06-09 NOTE — HISTORY OF PRESENT ILLNESS
[de-identified] : this is a 45 years old patient is  who is s/p right breast x 2 site stereotactic core biopsy on 19 with the following pathology:\par **1) Right breast upper outer posterior quadrant stereo bx- Proliferative and nonproliferative fibrocystic change with dense stromal fibrosis, extensive columnar cell change, PASH, apocrine metaplasia and microcyst formation. \par ***2) Right breast central outer quadrant stereo bx- LCIS and flat epithelial atypia. Proliferative and nonproliferative fibrocystic change with dense stromal fibrosis, extensive columnar cell change, PASH, multiple foci of microcalcifications. \par \par Ms. MONTES DE OCA  is s/p  Right breast GIOVANI  localized quadrant resection and  Oncoplastic reconstruction on 2020. Patient's pathology results were  consistent with Lobular carcinoma in situ, classic type (LCIS)  present in / slides, Flat epithelial atypia, ADH and  Pseudoangiomatous stromal hyperplasia (PASH). \par \par Family history of breast cancer involving her mother @ age 50 and then again @ age 60. Her mother  from leukemia/lymphoma. She is unsure whether her mom was BRCA negative. \par Denies palpable breast masses or nipple discharge\par Menarche age: 12 y/o\par LMP: 2020\par  (age at first pregnancy- 21 y/o)\par Oral contraceptives x 15 years \par \par PSH: Knee surgery , C-sections (, , ), BCC\par

## 2020-06-09 NOTE — CONSULT LETTER
[Courtesy Letter:] : I had the pleasure of seeing your patient, [unfilled], in my office today. [Dear  ___] : Dear  [unfilled], [Please see my note below.] : Please see my note below. [Consult Closing:] : Thank you very much for allowing me to participate in the care of this patient.  If you have any questions, please do not hesitate to contact me. [FreeTextEntry1] : I will keep you informed of my follow-up. [Sincerely,] : Sincerely, [FreeTextEntry3] : Gonzalo Mallory MD FACS\par Chief of Surgical Oncology\par \par  [DrMarry  ___] : Dr. CHEN

## 2020-06-09 NOTE — PHYSICAL EXAM
What Type Of Note Output Would You Prefer (Optional)?: Standard Output
How Severe Are Your Spot(S)?: mild
Have Your Spot(S) Been Treated In The Past?: has not been treated
Hpi Title: Evaluation of Skin Lesions
[de-identified] : right breast incision clear

## 2020-07-27 ENCOUNTER — APPOINTMENT (OUTPATIENT)
Dept: INTERNAL MEDICINE | Facility: CLINIC | Age: 45
End: 2020-07-27
Payer: COMMERCIAL

## 2020-07-27 VITALS
OXYGEN SATURATION: 98 % | BODY MASS INDEX: 19.84 KG/M2 | HEART RATE: 70 BPM | DIASTOLIC BLOOD PRESSURE: 70 MMHG | HEIGHT: 63 IN | SYSTOLIC BLOOD PRESSURE: 100 MMHG | WEIGHT: 112 LBS

## 2020-07-27 PROCEDURE — 99396 PREV VISIT EST AGE 40-64: CPT | Mod: 25

## 2020-07-27 PROCEDURE — G0442 ANNUAL ALCOHOL SCREEN 15 MIN: CPT

## 2020-07-27 PROCEDURE — G0009: CPT

## 2020-07-27 PROCEDURE — 90732 PPSV23 VACC 2 YRS+ SUBQ/IM: CPT

## 2020-08-02 NOTE — HISTORY OF PRESENT ILLNESS
[de-identified] : 46 yo female with hx right breast biopsy- flat atypia, LCIS and PASH in 2019, here for CPE.\par She is stereotactic core bx 1/5/19, s/p right breast lumpectomy 5/28/20.\par Following with breast surgon and would like followup with breast oncologist.

## 2020-08-02 NOTE — PHYSICAL EXAM
[No Acute Distress] : no acute distress [Well Nourished] : well nourished [Well Developed] : well developed [Normal Sclera/Conjunctiva] : normal sclera/conjunctiva [Well-Appearing] : well-appearing [EOMI] : extraocular movements intact [PERRL] : pupils equal round and reactive to light [Normal Oropharynx] : the oropharynx was normal [Normal Outer Ear/Nose] : the outer ears and nose were normal in appearance [No JVD] : no jugular venous distention [No Lymphadenopathy] : no lymphadenopathy [Supple] : supple [No Accessory Muscle Use] : no accessory muscle use [No Respiratory Distress] : no respiratory distress  [Thyroid Normal, No Nodules] : the thyroid was normal and there were no nodules present [Clear to Auscultation] : lungs were clear to auscultation bilaterally [Regular Rhythm] : with a regular rhythm [Normal Rate] : normal rate  [Normal S1, S2] : normal S1 and S2 [No Murmur] : no murmur heard [No Abdominal Bruit] : a ~M bruit was not heard ~T in the abdomen [No Carotid Bruits] : no carotid bruits [No Varicosities] : no varicosities [Pedal Pulses Present] : the pedal pulses are present [No Edema] : there was no peripheral edema [No Extremity Clubbing/Cyanosis] : no extremity clubbing/cyanosis [No Palpable Aorta] : no palpable aorta [Normal Appearance] : normal in appearance [Soft] : abdomen soft [No Axillary Lymphadenopathy] : no axillary lymphadenopathy [Non Tender] : non-tender [Non-distended] : non-distended [No Masses] : no abdominal mass palpated [No HSM] : no HSM [Normal Bowel Sounds] : normal bowel sounds [Normal Posterior Cervical Nodes] : no posterior cervical lymphadenopathy [Normal Anterior Cervical Nodes] : no anterior cervical lymphadenopathy [No CVA Tenderness] : no CVA  tenderness [No Spinal Tenderness] : no spinal tenderness [No Joint Swelling] : no joint swelling [Grossly Normal Strength/Tone] : grossly normal strength/tone [No Rash] : no rash [Coordination Grossly Intact] : coordination grossly intact [No Focal Deficits] : no focal deficits [Normal Gait] : normal gait [Deep Tendon Reflexes (DTR)] : deep tendon reflexes were 2+ and symmetric [Normal Affect] : the affect was normal [Normal Insight/Judgement] : insight and judgment were intact

## 2020-08-02 NOTE — HEALTH RISK ASSESSMENT
[No falls in past year] : Patient reported no falls in the past year [Yes] : Yes [0] : 2) Feeling down, depressed, or hopeless: Not at all (0) [] : No [Audit-CScore] : 0 [[id number]] : 0

## 2020-08-07 LAB
25(OH)D3 SERPL-MCNC: 48.3 NG/ML
ALBUMIN SERPL ELPH-MCNC: 4.6 G/DL
ALP BLD-CCNC: 47 U/L
ALT SERPL-CCNC: 13 U/L
ANION GAP SERPL CALC-SCNC: 14 MMOL/L
AST SERPL-CCNC: 17 U/L
BILIRUB SERPL-MCNC: 0.6 MG/DL
BUN SERPL-MCNC: 11 MG/DL
CALCIUM SERPL-MCNC: 9.1 MG/DL
CHLORIDE SERPL-SCNC: 105 MMOL/L
CHOLEST SERPL-MCNC: 169 MG/DL
CHOLEST/HDLC SERPL: 3 RATIO
CO2 SERPL-SCNC: 22 MMOL/L
CREAT SERPL-MCNC: 0.84 MG/DL
ESTIMATED AVERAGE GLUCOSE: 103 MG/DL
FERRITIN SERPL-MCNC: 15 NG/ML
GLUCOSE SERPL-MCNC: 89 MG/DL
HBA1C MFR BLD HPLC: 5.2 %
HDLC SERPL-MCNC: 56 MG/DL
IRON SATN MFR SERPL: 30 %
IRON SERPL-MCNC: 125 UG/DL
LDLC SERPL CALC-MCNC: 102 MG/DL
POTASSIUM SERPL-SCNC: 4.4 MMOL/L
PROT SERPL-MCNC: 6.4 G/DL
SODIUM SERPL-SCNC: 142 MMOL/L
TIBC SERPL-MCNC: 420 UG/DL
TRIGL SERPL-MCNC: 57 MG/DL
TSH SERPL-ACNC: 1.68 UIU/ML
UIBC SERPL-MCNC: 295 UG/DL
VIT B12 SERPL-MCNC: 377 PG/ML

## 2020-08-10 LAB
BASOPHILS # BLD AUTO: 0.03 K/UL
BASOPHILS NFR BLD AUTO: 0.6 %
EOSINOPHIL # BLD AUTO: 0.07 K/UL
EOSINOPHIL NFR BLD AUTO: 1.3 %
HCT VFR BLD CALC: 39.8 %
HGB BLD-MCNC: 12.6 G/DL
IMM GRANULOCYTES NFR BLD AUTO: 0.4 %
LYMPHOCYTES # BLD AUTO: 1.33 K/UL
LYMPHOCYTES NFR BLD AUTO: 24.8 %
MAN DIFF?: NORMAL
MCHC RBC-ENTMCNC: 28.6 PG
MCHC RBC-ENTMCNC: 31.7 GM/DL
MCV RBC AUTO: 90.5 FL
MONOCYTES # BLD AUTO: 0.39 K/UL
MONOCYTES NFR BLD AUTO: 7.3 %
NEUTROPHILS # BLD AUTO: 3.53 K/UL
NEUTROPHILS NFR BLD AUTO: 65.6 %
PLATELET # BLD AUTO: 201 K/UL
RBC # BLD: 4.4 M/UL
RBC # FLD: 13.7 %
WBC # FLD AUTO: 5.37 K/UL

## 2020-08-15 ENCOUNTER — OUTPATIENT (OUTPATIENT)
Dept: OUTPATIENT SERVICES | Facility: HOSPITAL | Age: 45
LOS: 1 days | Discharge: HOME | End: 2020-08-15

## 2020-08-15 DIAGNOSIS — Z98.890 OTHER SPECIFIED POSTPROCEDURAL STATES: Chronic | ICD-10-CM

## 2020-08-15 DIAGNOSIS — Z98.89 OTHER SPECIFIED POSTPROCEDURAL STATES: Chronic | ICD-10-CM

## 2020-08-15 DIAGNOSIS — C50.919 MALIGNANT NEOPLASM OF UNSPECIFIED SITE OF UNSPECIFIED FEMALE BREAST: ICD-10-CM

## 2020-08-18 ENCOUNTER — APPOINTMENT (OUTPATIENT)
Dept: HEMATOLOGY ONCOLOGY | Facility: CLINIC | Age: 45
End: 2020-08-18
Payer: COMMERCIAL

## 2020-08-18 VITALS
HEART RATE: 72 BPM | DIASTOLIC BLOOD PRESSURE: 64 MMHG | TEMPERATURE: 98.7 F | OXYGEN SATURATION: 99 % | BODY MASS INDEX: 19.32 KG/M2 | RESPIRATION RATE: 17 BRPM | WEIGHT: 111.77 LBS | HEIGHT: 63.78 IN | SYSTOLIC BLOOD PRESSURE: 98 MMHG

## 2020-08-18 DIAGNOSIS — Z78.9 OTHER SPECIFIED HEALTH STATUS: ICD-10-CM

## 2020-08-18 DIAGNOSIS — Z86.000 PERSONAL HISTORY OF IN-SITU NEOPLASM OF BREAST: ICD-10-CM

## 2020-08-18 DIAGNOSIS — R22.32 LOCALIZED SWELLING, MASS AND LUMP, LEFT UPPER LIMB: ICD-10-CM
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
## 2020-08-18 DIAGNOSIS — N64.89 OTHER SPECIFIED DISORDERS OF BREAST: ICD-10-CM

## 2020-08-18 DIAGNOSIS — Z30.09 ENCOUNTER FOR OTHER GENERAL COUNSELING AND ADVICE ON CONTRACEPTION: ICD-10-CM

## 2020-08-18 DIAGNOSIS — Z00.00 ENCOUNTER FOR GENERAL ADULT MEDICAL EXAMINATION W/OUT ABNORMAL FINDINGS: ICD-10-CM

## 2020-08-18 DIAGNOSIS — Z87.898 PERSONAL HISTORY OF OTHER SPECIFIED CONDITIONS: ICD-10-CM

## 2020-08-18 DIAGNOSIS — R92.1 MAMMOGRAPHIC CALCIFICATION FOUND ON DIAGNOSTIC IMAGING OF BREAST: ICD-10-CM

## 2020-08-18 DIAGNOSIS — Z86.2 PERSONAL HISTORY OF DISEASES OF THE BLOOD AND BLOOD-FORMING ORGANS AND CERTAIN DISORDERS INVOLVING THE IMMUNE MECHANISM: ICD-10-CM

## 2020-08-18 DIAGNOSIS — R05 COUGH: ICD-10-CM

## 2020-08-18 PROCEDURE — 99355: CPT | Mod: 25

## 2020-08-18 PROCEDURE — 99205 OFFICE O/P NEW HI 60 MIN: CPT

## 2020-08-18 PROCEDURE — 99354: CPT | Mod: 25

## 2020-08-29 PROBLEM — N64.89 PSEUDOANGIOMATOUS STROMAL HYPERPLASIA OF BREAST: Status: RESOLVED | Noted: 2020-02-10 | Resolved: 2020-08-29

## 2020-08-29 PROBLEM — Z86.2 HISTORY OF IRON DEFICIENCY ANEMIA: Status: RESOLVED | Noted: 2019-06-18 | Resolved: 2020-08-29

## 2020-08-29 PROBLEM — Z87.898 HISTORY OF ABNORMAL MAMMOGRAM: Status: RESOLVED | Noted: 2019-09-30 | Resolved: 2020-08-29

## 2020-08-29 PROBLEM — Z78.9 SOCIAL ALCOHOL USE: Status: ACTIVE | Noted: 2020-08-29

## 2020-08-29 PROBLEM — Z86.000: Status: RESOLVED | Noted: 2020-06-09 | Resolved: 2020-08-29

## 2020-08-29 PROBLEM — R92.1 BREAST CALCIFICATIONS ON MAMMOGRAM: Status: RESOLVED | Noted: 2019-11-29 | Resolved: 2020-08-29

## 2020-08-29 PROBLEM — Z87.898 HISTORY OF BREAST LUMP: Status: RESOLVED | Noted: 2019-03-13 | Resolved: 2020-08-29
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
## 2020-08-29 RX ORDER — OSELTAMIVIR PHOSPHATE 75 MG/1
75 CAPSULE ORAL
Qty: 10 | Refills: 0 | Status: DISCONTINUED | COMMUNITY
Start: 2020-01-21 | End: 2020-08-29

## 2020-08-29 NOTE — HISTORY OF PRESENT ILLNESS
[de-identified] : LCIS of the right breast at age 45\par 04/01/19 Mammogram and breast ultrasound showed probably benign new clusters of calcifications in the right upper and right upper outer breast, for which six month mammographic follow-up is recommended. The breast parenchyma demonstrates a heterogeneous background echotexture (mixed fatty and \par fibroglandular).  2:00, 4 cm from the nipple, 7 x 3 x 7 mm hypoechoic nodule is stable since June 2017. 9:00 to 10:00, 6 cm from the nipple, 13 x 7 x 20 mm cyst cluster is essentially stable since June 2017. There are multiple scattered cyst clusters and benign cysts measuring up to 16 mm in size at 12:00, 3 cm from \par the nipple. \par 11/27/19 Right  mammogram  showed extremely dense breast. Grouping of indeterminate calcifications in the central outer aspect middle depth for which \par stereotactic biopsy is recommended. Multiple newly seen fine indeterminate calcifications in the upper outer posterior right breast for which stereotactic biopsy was recommended.  Small grouping of calcifications in the upper central aspect middle-posterior depth which are without significant change. 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 \par        Right breast central outer quadrant, stereotactic vacuum assisted core biopsy lobular carcinoma in situ and flat epithelial atypia. 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  Multiple foci of microcalcifications present in columnar cell change, and fibrocystic changes\par 01/25/20 Right upper central  breast stereotactic core biopsy showed proliferative and non proliferative fibrocystic disease including columnar cell hyperplasia, adenosis and microcysts and fibrosis. Microcalcification associated with columnar cell hyperplasia.\par 05/09/20 MRI of the breast showed marked background enhancement limits sensitivity of MRI. No dominant suspicious enhancement in the outer right breast at site of biopsy-proven LCIS and atypia. Recommend appropriate surgical management.\par 06/01/20 Right breast lumpectomy showed LCIS, classic type, extensive usual ductal hyperplasia, flat epithelial atypia, PASH and fibrocystic changes. 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

## 2020-08-29 NOTE — PHYSICAL EXAM
[Fully active, able to carry on all pre-disease performance without restriction] : Status 0 - Fully active, able to carry on all pre-disease performance without restriction [Normal] : affect appropriate [de-identified] : Well healed incision

## 2020-08-29 NOTE — CONSULT LETTER
[Consult Closing:] : Thank you very much for allowing me to participate in the care of this patient.  If you have any questions, please do not hesitate to contact me. [Please see my note below.] : Please see my note below. [Sincerely,] : Sincerely, [Dear  ___] : Dear  [unfilled], [( Thank you for referring [unfilled] for consultation for _____ )] : Thank you for referring [unfilled] for consultation for [unfilled] [DrMarry ___] : Dr. CHEN [DrMarry  ___] : Dr. CHEN [FreeTextEntry2] : Inessa Ortiz MD\par [address] [facility]\[place] [address] [FreeTextEntry3] : Patti Chao MD\par Associate Chief \par [facility]\par [facility]\par  of Medicine\par [employer]\par \par

## 2020-08-29 NOTE — OB HISTORY
[Menarche Age: ____] : age at menarche was [unfilled] [Regular Cycle Intervals] : periods have been regular [___] :  Spontaneous: [unfilled]

## 2020-10-29 ENCOUNTER — OUTPATIENT (OUTPATIENT)
Dept: OUTPATIENT SERVICES | Facility: HOSPITAL | Age: 45
LOS: 1 days | Discharge: HOME | End: 2020-10-29

## 2020-10-29 DIAGNOSIS — Z98.890 OTHER SPECIFIED POSTPROCEDURAL STATES: Chronic | ICD-10-CM

## 2020-10-29 DIAGNOSIS — Z98.89 OTHER SPECIFIED POSTPROCEDURAL STATES: Chronic | ICD-10-CM

## 2020-10-29 DIAGNOSIS — C50.919 MALIGNANT NEOPLASM OF UNSPECIFIED SITE OF UNSPECIFIED FEMALE BREAST: ICD-10-CM

## 2020-11-25 ENCOUNTER — APPOINTMENT (OUTPATIENT)
Dept: ULTRASOUND IMAGING | Facility: CLINIC | Age: 45
End: 2020-11-25
Payer: COMMERCIAL

## 2020-11-25 ENCOUNTER — OUTPATIENT (OUTPATIENT)
Dept: OUTPATIENT SERVICES | Facility: HOSPITAL | Age: 45
LOS: 1 days | End: 2020-11-25
Payer: COMMERCIAL

## 2020-11-25 ENCOUNTER — APPOINTMENT (OUTPATIENT)
Dept: MAMMOGRAPHY | Facility: CLINIC | Age: 45
End: 2020-11-25
Payer: COMMERCIAL

## 2020-11-25 DIAGNOSIS — Z00.8 ENCOUNTER FOR OTHER GENERAL EXAMINATION: ICD-10-CM

## 2020-11-25 DIAGNOSIS — Z98.890 OTHER SPECIFIED POSTPROCEDURAL STATES: Chronic | ICD-10-CM

## 2020-11-25 DIAGNOSIS — Z98.89 OTHER SPECIFIED POSTPROCEDURAL STATES: Chronic | ICD-10-CM

## 2020-11-25 PROCEDURE — 77067 SCR MAMMO BI INCL CAD: CPT | Mod: 26

## 2020-11-25 PROCEDURE — 76641 ULTRASOUND BREAST COMPLETE: CPT | Mod: 26,50

## 2020-11-25 PROCEDURE — 77067 SCR MAMMO BI INCL CAD: CPT

## 2020-11-25 PROCEDURE — 77063 BREAST TOMOSYNTHESIS BI: CPT

## 2020-11-25 PROCEDURE — 77063 BREAST TOMOSYNTHESIS BI: CPT | Mod: 26

## 2020-11-25 PROCEDURE — 76641 ULTRASOUND BREAST COMPLETE: CPT

## 2021-01-08 ENCOUNTER — APPOINTMENT (OUTPATIENT)
Dept: INTERNAL MEDICINE | Facility: CLINIC | Age: 46
End: 2021-01-08
Payer: COMMERCIAL

## 2021-01-08 PROCEDURE — 99213 OFFICE O/P EST LOW 20 MIN: CPT | Mod: 95

## 2021-01-14 ENCOUNTER — APPOINTMENT (OUTPATIENT)
Dept: INTERNAL MEDICINE | Facility: CLINIC | Age: 46
End: 2021-01-14
Payer: COMMERCIAL

## 2021-01-14 DIAGNOSIS — M25.521 PAIN IN RIGHT ELBOW: ICD-10-CM

## 2021-01-14 DIAGNOSIS — M77.9 ENTHESOPATHY, UNSPECIFIED: ICD-10-CM

## 2021-01-14 PROCEDURE — 99212 OFFICE O/P EST SF 10 MIN: CPT | Mod: 95

## 2021-01-21 ENCOUNTER — APPOINTMENT (OUTPATIENT)
Dept: OBGYN | Facility: CLINIC | Age: 46
End: 2021-01-21
Payer: COMMERCIAL

## 2021-01-21 ENCOUNTER — APPOINTMENT (OUTPATIENT)
Dept: ORTHOPEDIC SURGERY | Facility: CLINIC | Age: 46
End: 2021-01-21
Payer: COMMERCIAL

## 2021-01-21 VITALS — HEART RATE: 76 BPM | DIASTOLIC BLOOD PRESSURE: 79 MMHG | SYSTOLIC BLOOD PRESSURE: 119 MMHG

## 2021-01-21 VITALS
DIASTOLIC BLOOD PRESSURE: 85 MMHG | WEIGHT: 114.38 LBS | HEIGHT: 63.78 IN | BODY MASS INDEX: 19.77 KG/M2 | SYSTOLIC BLOOD PRESSURE: 124 MMHG

## 2021-01-21 VITALS — TEMPERATURE: 97.1 F

## 2021-01-21 DIAGNOSIS — M77.11 LATERAL EPICONDYLITIS, RIGHT ELBOW: ICD-10-CM

## 2021-01-21 PROCEDURE — 99072 ADDL SUPL MATRL&STAF TM PHE: CPT

## 2021-01-21 PROCEDURE — 99243 OFF/OP CNSLTJ NEW/EST LOW 30: CPT

## 2021-01-21 PROCEDURE — 99396 PREV VISIT EST AGE 40-64: CPT

## 2021-01-21 PROCEDURE — 73080 X-RAY EXAM OF ELBOW: CPT | Mod: RT

## 2021-01-22 PROBLEM — M77.11 LATERAL EPICONDYLITIS OF RIGHT ELBOW: Status: ACTIVE | Noted: 2021-01-22

## 2021-01-22 NOTE — ADDENDUM
[FreeTextEntry1] : This note was written by Shellie Cruz on 01/21/2021 acting solely as a scribe for Dr. Juan Gutierrez.\par \par All medical record entries made by the Scribe were at my, Dr. Juan Gutierrez, direction and personally dictated by me on 01/21/2021. I have personally reviewed the chart and agree that the record accurately reflects my personal performance of the history, physical exam, assessment and plan.

## 2021-01-22 NOTE — DISCUSSION/SUMMARY
[de-identified] : 45 year old female with right lateral epicondylitis. \par \par I discussed with the patient the treatment of lateral epicondylitis. I discussed that this is a degenerative condition rather than an inflammatory condition and that the process tends to be reversible (albeit can last from 6-24mos if untreated). The best source of treatment is to reduce the offending repetitive overuse injury process and I counseled the patient on how to do this. First line treatment can include watchful waiting for a period of 6-8 wks with specific activity modification and OTC NSAID's/Tylenol. Further treatment includes the use of counterforce bracing, physical therapy for stretching and strengthening, and the use of injection therapy (steroids/PRP etc). I also discussed the potential role of surgical intervention for chronic symptoms that persist greater than 6-12 months.\par \par At this time as treatment I recommended a period of relative rest, stretching and strengthening modalities as indicated in a patient handout provided as well as counterforce bracing. If no improvement over the next few months, additional treatment such as corticosteroid injection versus formal physical therapy can be performed.\par \par We will see the patient back as needed.

## 2021-01-22 NOTE — CONSULT LETTER
[Dear  ___] : Dear  [unfilled], [Consult Letter:] : I had the pleasure of evaluating your patient, [unfilled]. [Please see my note below.] : Please see my note below. [Consult Closing:] : Thank you very much for allowing me to participate in the care of this patient.  If you have any questions, please do not hesitate to contact me. [Sincerely,] : Sincerely, [FreeTextEntry3] : Juan Gutierrez MD\par ______________________________________________\par [facility]: Sports Medicine\par [address], [address], [place] [address]\par (t) [phone]\par (f) [phone]

## 2021-01-22 NOTE — PHYSICAL EXAM
[de-identified] : Oriented to time, place, person\par Mood: Normal\par Affect: Normal\par Appearance: Healthy, well appearing, no acute distress.\par Gait: Normal\par Assistive Devices: None\par \par Right elbow exam\par \par Skin: Clean, dry, intact. No ecchymosis. No swelling. No palpable joint effusion.\par ROM: Full extension/flexion, full supination/pronation.\par Painful ROM: Lateral elbow pain with resisted wrist extension\par Tenderness: No medial epicondyle pain. Positive lateral epicondyle pain (ECRB). No olecranon pain. No pain at radial head. No pain to radial tunnel.\par Strength: 5/5 elbow flexion, 5/5 elbow extension, 5/5 supination, 5/5 pronation\par Stability: Stable to vaus/valgus stress\par Vasc: 2+ radial pulse, <2s cap refill\par Sensation: In tact to light touch throughout\par Neuro: Negative Tinel at ulnar canal, AIN/PIN/Ulnar nerve in tact to motor/sensation.  [de-identified] : The following radiographs were ordered and read by me during this patients visit. I reviewed each radiograph in detail with the patient and discussed the findings as highlighted below. \par \par 3 views of the right elbow were obtained today, 01/21/2021, that show no acute fracture or dislocation. There is no degenerative change seen. There is no gross malalignment. No significant other obvious osseous abnormality, otherwise unremarkable.

## 2021-01-27 ENCOUNTER — OUTPATIENT (OUTPATIENT)
Dept: OUTPATIENT SERVICES | Facility: HOSPITAL | Age: 46
LOS: 1 days | Discharge: HOME | End: 2021-01-27

## 2021-01-27 DIAGNOSIS — Z98.89 OTHER SPECIFIED POSTPROCEDURAL STATES: Chronic | ICD-10-CM

## 2021-01-27 DIAGNOSIS — Z98.890 OTHER SPECIFIED POSTPROCEDURAL STATES: Chronic | ICD-10-CM

## 2021-01-27 DIAGNOSIS — C50.919 MALIGNANT NEOPLASM OF UNSPECIFIED SITE OF UNSPECIFIED FEMALE BREAST: ICD-10-CM

## 2021-02-02 ENCOUNTER — APPOINTMENT (OUTPATIENT)
Dept: HEMATOLOGY ONCOLOGY | Facility: CLINIC | Age: 46
End: 2021-02-02
Payer: COMMERCIAL

## 2021-02-02 PROCEDURE — 99214 OFFICE O/P EST MOD 30 MIN: CPT | Mod: 95

## 2021-02-02 NOTE — REASON FOR VISIT
[Follow-Up Visit] : a follow-up [Home] : at home, [unfilled] , at the time of the visit. [Medical Office: ([facility])___] : at the medical office located in  [Verbal consent obtained from patient] : the patient, [unfilled] [FreeTextEntry2] : LCIS

## 2021-02-02 NOTE — HISTORY OF PRESENT ILLNESS
[de-identified] : LCIS of the right breast at age 45\par 04/01/19 Mammogram and breast ultrasound showed probably benign new clusters of calcifications in the right upper and right upper outer breast, for which six month mammographic follow-up is recommended. The breast parenchyma demonstrates a heterogeneous background echotexture (mixed fatty and \par fibroglandular).  2:00, 4 cm from the nipple, 7 x 3 x 7 mm hypoechoic nodule is stable since June 2017. 9:00 to 10:00, 6 cm from the nipple, 13 x 7 x 20 mm cyst cluster is essentially stable since June 2017. There are multiple scattered cyst clusters and benign cysts measuring up to 16 mm in size at 12:00, 3 cm from \par the nipple. \par 11/27/19 Right mammogram showed extremely dense breast. Grouping of indeterminate calcifications in the central outer aspect middle depth for which \par stereotactic biopsy is recommended. Multiple newly seen fine indeterminate calcifications in the upper outer posterior right breast for which stereotactic biopsy was recommended.  Small grouping of calcifications in the upper central aspect middle-posterior depth which are without significant change. 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 \par        Right breast central outer quadrant, stereotactic vacuum assisted core biopsy lobular carcinoma in situ and flat epithelial atypia. 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  Multiple foci of microcalcifications present in columnar cell change, and fibrocystic changes\par 01/25/20 Right upper central  breast stereotactic core biopsy showed proliferative and non proliferative fibrocystic disease including columnar cell hyperplasia, adenosis and microcysts and fibrosis. Microcalcification associated with columnar cell hyperplasia.\par 05/09/20 MRI of the breast showed marked background enhancement limits sensitivity of MRI. No dominant suspicious enhancement in the outer right breast at site of biopsy-proven LCIS and atypia. Recommend appropriate surgical management.\par 06/01/20 Right breast lumpectomy showed LCIS, classic type, extensive usual ductal hyperplasia, flat epithelial atypia, PASH and fibrocystic changes. Interior margin right breast excision columnar cell hyperplasia with microcalcifications, hemangioma and ALH\par 9/20 Tamoxifen started [de-identified] : SHAHID [de-identified] : Madison is seen for a virtual follow up visit today due to the COVID-19 pandemic.\par She started tamoxifen in 9/20 for breast cancer prevention following the diagnosis of LCIS. She is tolerating it fairly well aside from hot flashes at night, which wake her up once or twice. She usually gets back to sleep so this is an option if needed. She is not having any vaginal dryness or discharge.\par She continue to have periods although they are very irregular now. 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 Win\par Colonoscopy: none\par Genetic testing: Mother had breast cancer at age 51 and again at age 60, maternal aunt had LCIS at age 60; discussed value of genetic testing\par

## 2021-02-02 NOTE — OB HISTORY
[Regular Cycle Intervals] : periods have been regular [Menarche Age: ____] : age at menarche was [unfilled] [___] :  Spontaneous: [unfilled]

## 2021-02-02 NOTE — PHYSICAL EXAM
[Fully active, able to carry on all pre-disease performance without restriction] : Status 0 - Fully active, able to carry on all pre-disease performance without restriction [Normal] : affect appropriate [de-identified] : Normal respiratory effort. Speaking in full sentences without difficulty

## 2021-02-24 ENCOUNTER — TRANSCRIPTION ENCOUNTER (OUTPATIENT)
Age: 46
End: 2021-02-24

## 2021-03-17 ENCOUNTER — RX RENEWAL (OUTPATIENT)
Age: 46
End: 2021-03-17

## 2021-03-25 PROBLEM — M77.9 TENDONITIS: Status: ACTIVE | Noted: 2021-01-08

## 2021-03-25 NOTE — HISTORY OF PRESENT ILLNESS
[Home] : at home, [unfilled] , at the time of the visit. [Medical Office: ([facility])___] : at the medical office located in  [Verbal consent obtained from patient] : the patient, [unfilled] [FreeTextEntry8] : 5 weeks ago, woke up-right arm pain localized  on elbow, feels it all day, when she moves it, not at rest\par no tingling, numbness,  weakness or swelling\par working out\par left handed\par runs on elliptycal or bar classes, light weights\par certain motions bring out pain\par took ibuprofen\par

## 2021-03-25 NOTE — HISTORY OF PRESENT ILLNESS
[Home] : at home, [unfilled] , at the time of the visit. [Medical Office: ([facility])___] : at the medical office located in  [Verbal consent obtained from patient] : the patient, [unfilled] [de-identified] : Here to follow-up for right lateral elbow pain when seen last week via telehealth visit.  Patient has had right elbow pain since December 2020 that is brought on with certain motions like raising her arm.  Pain is localized to the lateral aspect of the elbow.  There is no history of trauma.  Patient was given advised to get a splint but no relief. Has not used any NSAIDs or pain medications.

## 2021-05-26 ENCOUNTER — APPOINTMENT (OUTPATIENT)
Dept: MRI IMAGING | Facility: IMAGING CENTER | Age: 46
End: 2021-05-26
Payer: COMMERCIAL

## 2021-05-26 ENCOUNTER — OUTPATIENT (OUTPATIENT)
Dept: OUTPATIENT SERVICES | Facility: HOSPITAL | Age: 46
LOS: 1 days | End: 2021-05-26
Payer: COMMERCIAL

## 2021-05-26 DIAGNOSIS — D05.01 LOBULAR CARCINOMA IN SITU OF RIGHT BREAST: ICD-10-CM

## 2021-05-26 DIAGNOSIS — Z98.89 OTHER SPECIFIED POSTPROCEDURAL STATES: Chronic | ICD-10-CM

## 2021-05-26 DIAGNOSIS — Z00.8 ENCOUNTER FOR OTHER GENERAL EXAMINATION: ICD-10-CM

## 2021-05-26 DIAGNOSIS — Z91.89 OTHER SPECIFIED PERSONAL RISK FACTORS, NOT ELSEWHERE CLASSIFIED: ICD-10-CM

## 2021-05-26 DIAGNOSIS — Z98.890 OTHER SPECIFIED POSTPROCEDURAL STATES: Chronic | ICD-10-CM

## 2021-05-26 PROCEDURE — C8937: CPT

## 2021-05-26 PROCEDURE — 77049 MRI BREAST C-+ W/CAD BI: CPT | Mod: 26

## 2021-05-26 PROCEDURE — A9585: CPT

## 2021-05-26 PROCEDURE — C8908: CPT

## 2021-08-09 ENCOUNTER — APPOINTMENT (OUTPATIENT)
Dept: INTERNAL MEDICINE | Facility: CLINIC | Age: 46
End: 2021-08-09
Payer: COMMERCIAL

## 2021-08-09 VITALS
OXYGEN SATURATION: 99 % | HEIGHT: 63 IN | HEART RATE: 84 BPM | SYSTOLIC BLOOD PRESSURE: 110 MMHG | WEIGHT: 111 LBS | BODY MASS INDEX: 19.67 KG/M2 | DIASTOLIC BLOOD PRESSURE: 70 MMHG

## 2021-08-09 PROCEDURE — 99396 PREV VISIT EST AGE 40-64: CPT

## 2021-08-09 PROCEDURE — G0444 DEPRESSION SCREEN ANNUAL: CPT | Mod: 59

## 2021-08-09 PROCEDURE — G0442 ANNUAL ALCOHOL SCREEN 15 MIN: CPT

## 2021-08-09 RX ORDER — AZITHROMYCIN 250 MG/1
250 TABLET, FILM COATED ORAL
Qty: 1 | Refills: 0 | Status: DISCONTINUED | COMMUNITY
Start: 2020-07-27 | End: 2021-08-09

## 2021-08-09 RX ORDER — GABAPENTIN 300 MG/1
300 CAPSULE ORAL
Qty: 30 | Refills: 2 | Status: DISCONTINUED | COMMUNITY
Start: 2021-03-04 | End: 2021-08-09

## 2021-08-16 NOTE — HEALTH RISK ASSESSMENT
[] : No [Yes] : Yes [2 - 4 times a month (2 pts)] : 2-4 times a month (2 points) [No falls in past year] : Patient reported no falls in the past year [0] : 2) Feeling down, depressed, or hopeless: Not at all (0) [PHQ-2 Negative - No further assessment needed] : PHQ-2 Negative - No further assessment needed [Audit-CScore] : 2 [[id number]] : 0

## 2021-08-16 NOTE — HISTORY OF PRESENT ILLNESS
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

## 2021-08-17 ENCOUNTER — OUTPATIENT (OUTPATIENT)
Dept: OUTPATIENT SERVICES | Facility: HOSPITAL | Age: 46
LOS: 1 days | Discharge: HOME | End: 2021-08-17

## 2021-08-17 DIAGNOSIS — C50.919 MALIGNANT NEOPLASM OF UNSPECIFIED SITE OF UNSPECIFIED FEMALE BREAST: ICD-10-CM

## 2021-08-17 DIAGNOSIS — Z98.89 OTHER SPECIFIED POSTPROCEDURAL STATES: Chronic | ICD-10-CM

## 2021-08-17 DIAGNOSIS — Z98.890 OTHER SPECIFIED POSTPROCEDURAL STATES: Chronic | ICD-10-CM

## 2021-09-02 LAB
ALBUMIN SERPL ELPH-MCNC: 4.6 G/DL
ALP BLD-CCNC: 34 U/L
ALT SERPL-CCNC: 16 U/L
ANION GAP SERPL CALC-SCNC: 12 MMOL/L
AST SERPL-CCNC: 18 U/L
BASOPHILS # BLD AUTO: 0.04 K/UL
BASOPHILS NFR BLD AUTO: 0.5 %
BILIRUB SERPL-MCNC: 0.6 MG/DL
BUN SERPL-MCNC: 12 MG/DL
CALCIUM SERPL-MCNC: 9.4 MG/DL
CHLORIDE SERPL-SCNC: 106 MMOL/L
CO2 SERPL-SCNC: 23 MMOL/L
COVID-19 NUCLEOCAPSID  GAM ANTIBODY INTERPRETATION: NEGATIVE
COVID-19 SPIKE DOMAIN ANTIBODY INTERPRETATION: POSITIVE
CREAT SERPL-MCNC: 0.85 MG/DL
EOSINOPHIL # BLD AUTO: 0.06 K/UL
EOSINOPHIL NFR BLD AUTO: 0.8 %
ESTIMATED AVERAGE GLUCOSE: 94 MG/DL
FERRITIN SERPL-MCNC: 18 NG/ML
GLUCOSE SERPL-MCNC: 75 MG/DL
HBA1C MFR BLD HPLC: 4.9 %
HCT VFR BLD CALC: 39.6 %
HGB BLD-MCNC: 12.9 G/DL
IMM GRANULOCYTES NFR BLD AUTO: 0.3 %
IRON SATN MFR SERPL: 95 %
IRON SERPL-MCNC: 407 UG/DL
LYMPHOCYTES # BLD AUTO: 2.2 K/UL
LYMPHOCYTES NFR BLD AUTO: 27.8 %
MAN DIFF?: NORMAL
MCHC RBC-ENTMCNC: 29.1 PG
MCHC RBC-ENTMCNC: 32.6 GM/DL
MCV RBC AUTO: 89.4 FL
MONOCYTES # BLD AUTO: 0.49 K/UL
MONOCYTES NFR BLD AUTO: 6.2 %
NEUTROPHILS # BLD AUTO: 5.09 K/UL
NEUTROPHILS NFR BLD AUTO: 64.4 %
PLATELET # BLD AUTO: 201 K/UL
POTASSIUM SERPL-SCNC: 4.4 MMOL/L
PROT SERPL-MCNC: 6.4 G/DL
RBC # BLD: 4.43 M/UL
RBC # FLD: 14.2 %
SARS-COV-2 AB SERPL IA-ACNC: >250 U/ML
SARS-COV-2 AB SERPL QL IA: 0.08 INDEX
SODIUM SERPL-SCNC: 141 MMOL/L
TIBC SERPL-MCNC: 427 UG/DL
TSH SERPL-ACNC: 1.8 UIU/ML
UIBC SERPL-MCNC: 21 UG/DL
WBC # FLD AUTO: 7.9 K/UL

## 2021-09-28 ENCOUNTER — OUTPATIENT (OUTPATIENT)
Dept: OUTPATIENT SERVICES | Facility: HOSPITAL | Age: 46
LOS: 1 days | Discharge: HOME | End: 2021-09-28

## 2021-09-28 DIAGNOSIS — Z98.890 OTHER SPECIFIED POSTPROCEDURAL STATES: Chronic | ICD-10-CM

## 2021-09-28 DIAGNOSIS — Z98.89 OTHER SPECIFIED POSTPROCEDURAL STATES: Chronic | ICD-10-CM

## 2021-09-28 DIAGNOSIS — C50.919 MALIGNANT NEOPLASM OF UNSPECIFIED SITE OF UNSPECIFIED FEMALE BREAST: ICD-10-CM

## 2021-09-29 ENCOUNTER — APPOINTMENT (OUTPATIENT)
Dept: HEMATOLOGY ONCOLOGY | Facility: CLINIC | Age: 46
End: 2021-09-29
Payer: COMMERCIAL

## 2021-09-29 VITALS
BODY MASS INDEX: 20.12 KG/M2 | WEIGHT: 113.54 LBS | TEMPERATURE: 97.4 F | HEIGHT: 62.99 IN | HEART RATE: 83 BPM | RESPIRATION RATE: 17 BRPM | SYSTOLIC BLOOD PRESSURE: 108 MMHG | OXYGEN SATURATION: 99 % | DIASTOLIC BLOOD PRESSURE: 77 MMHG

## 2021-09-29 PROCEDURE — 99214 OFFICE O/P EST MOD 30 MIN: CPT

## 2021-09-29 NOTE — HISTORY OF PRESENT ILLNESS
[de-identified] : LCIS of the right breast at age 45\par 04/01/19 Mammogram and breast ultrasound showed probably benign new clusters of calcifications in the right upper and right upper outer breast, for which six month mammographic follow-up is recommended. The breast parenchyma demonstrates a heterogeneous background echotexture (mixed fatty and \par fibroglandular).  2:00, 4 cm from the nipple, 7 x 3 x 7 mm hypoechoic nodule is stable since June 2017. 9:00 to 10:00, 6 cm from the nipple, 13 x 7 x 20 mm cyst cluster is essentially stable since June 2017. There are multiple scattered cyst clusters and benign cysts measuring up to 16 mm in size at 12:00, 3 cm from \par the nipple. \par 11/27/19 Right mammogram showed extremely dense breast. Grouping of indeterminate calcifications in the central outer aspect middle depth for which \par stereotactic biopsy is recommended. Multiple newly seen fine indeterminate calcifications in the upper outer posterior right breast for which stereotactic biopsy was recommended.  Small grouping of calcifications in the upper central aspect middle-posterior depth which are without significant change. 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 \par        Right breast central outer quadrant, stereotactic vacuum assisted core biopsy lobular carcinoma in situ and flat epithelial atypia. 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  Multiple foci of microcalcifications present in columnar cell change, and fibrocystic changes\par 01/25/20 Right upper central  breast stereotactic core biopsy showed proliferative and non proliferative fibrocystic disease including columnar cell hyperplasia, adenosis and microcysts and fibrosis. Microcalcification associated with columnar cell hyperplasia.\par 05/09/20 MRI of the breast showed marked background enhancement limits sensitivity of MRI. No dominant suspicious enhancement in the outer right breast at site of biopsy-proven LCIS and atypia. Recommend appropriate surgical management.\par 06/01/20 Right breast lumpectomy showed LCIS, classic type, extensive usual ductal hyperplasia, flat epithelial atypia, PASH and fibrocystic changes. Interior margin right breast excision columnar cell hyperplasia with microcalcifications, hemangioma and ALH\par 9/20 Tamoxifen started [de-identified] : SHAHID [de-identified] : Madison started tamoxifen in 9/20 for breast cancer prevention following the diagnosis of LCIS. She is tolerating it fairly well aside from hot flashes at night, which wake her up once or twice. She usually gets back to sleep, but often has to change as she is drenched. She tried gabapentin 300 mg qhs but it made her groggy the next morning.\par She is not having any vaginal dryness or discharge.\par She continues to have periods which were more irregular at first but are more regular again now. LMP in 9/21. 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 Win\par Colonoscopy: none\par Genetic testing: Mother had breast cancer at age 51 and again at age 60, maternal aunt had LCIS at age 60; discussed value of genetic testing\par

## 2021-10-24 ENCOUNTER — TRANSCRIPTION ENCOUNTER (OUTPATIENT)
Age: 46
End: 2021-10-24

## 2021-11-29 ENCOUNTER — OUTPATIENT (OUTPATIENT)
Dept: OUTPATIENT SERVICES | Facility: HOSPITAL | Age: 46
LOS: 1 days | End: 2021-11-29
Payer: COMMERCIAL

## 2021-11-29 ENCOUNTER — APPOINTMENT (OUTPATIENT)
Dept: MAMMOGRAPHY | Facility: IMAGING CENTER | Age: 46
End: 2021-11-29
Payer: COMMERCIAL

## 2021-11-29 ENCOUNTER — APPOINTMENT (OUTPATIENT)
Dept: ULTRASOUND IMAGING | Facility: IMAGING CENTER | Age: 46
End: 2021-11-29
Payer: COMMERCIAL

## 2021-11-29 DIAGNOSIS — Z98.890 OTHER SPECIFIED POSTPROCEDURAL STATES: Chronic | ICD-10-CM

## 2021-11-29 DIAGNOSIS — Z98.89 OTHER SPECIFIED POSTPROCEDURAL STATES: Chronic | ICD-10-CM

## 2021-11-29 DIAGNOSIS — Z00.8 ENCOUNTER FOR OTHER GENERAL EXAMINATION: ICD-10-CM

## 2021-11-29 PROCEDURE — 77063 BREAST TOMOSYNTHESIS BI: CPT | Mod: 26

## 2021-11-29 PROCEDURE — 76641 ULTRASOUND BREAST COMPLETE: CPT | Mod: 26,50

## 2021-11-29 PROCEDURE — 77063 BREAST TOMOSYNTHESIS BI: CPT

## 2021-11-29 PROCEDURE — 77067 SCR MAMMO BI INCL CAD: CPT

## 2021-11-29 PROCEDURE — 77067 SCR MAMMO BI INCL CAD: CPT | Mod: 26

## 2021-11-29 PROCEDURE — 76641 ULTRASOUND BREAST COMPLETE: CPT

## 2021-12-06 ENCOUNTER — APPOINTMENT (OUTPATIENT)
Dept: ULTRASOUND IMAGING | Facility: IMAGING CENTER | Age: 46
End: 2021-12-06
Payer: COMMERCIAL

## 2021-12-06 ENCOUNTER — OUTPATIENT (OUTPATIENT)
Dept: OUTPATIENT SERVICES | Facility: HOSPITAL | Age: 46
LOS: 1 days | End: 2021-12-06
Payer: COMMERCIAL

## 2021-12-06 DIAGNOSIS — Z00.8 ENCOUNTER FOR OTHER GENERAL EXAMINATION: ICD-10-CM

## 2021-12-06 DIAGNOSIS — Z98.89 OTHER SPECIFIED POSTPROCEDURAL STATES: Chronic | ICD-10-CM

## 2021-12-06 DIAGNOSIS — Z98.890 OTHER SPECIFIED POSTPROCEDURAL STATES: Chronic | ICD-10-CM

## 2021-12-06 PROCEDURE — 76642 ULTRASOUND BREAST LIMITED: CPT | Mod: 26,RT

## 2021-12-06 PROCEDURE — 76642 ULTRASOUND BREAST LIMITED: CPT

## 2021-12-15 ENCOUNTER — TRANSCRIPTION ENCOUNTER (OUTPATIENT)
Age: 46
End: 2021-12-15

## 2022-01-06 ENCOUNTER — NON-APPOINTMENT (OUTPATIENT)
Age: 47
End: 2022-01-06

## 2022-01-09 ENCOUNTER — NON-APPOINTMENT (OUTPATIENT)
Age: 47
End: 2022-01-09

## 2022-02-07 ENCOUNTER — NON-APPOINTMENT (OUTPATIENT)
Age: 47
End: 2022-02-07

## 2022-02-07 ENCOUNTER — APPOINTMENT (OUTPATIENT)
Dept: INTERNAL MEDICINE | Facility: CLINIC | Age: 47
End: 2022-02-07
Payer: COMMERCIAL

## 2022-02-07 VITALS
HEART RATE: 87 BPM | BODY MASS INDEX: 19.84 KG/M2 | OXYGEN SATURATION: 99 % | WEIGHT: 112 LBS | HEIGHT: 62.99 IN | DIASTOLIC BLOOD PRESSURE: 70 MMHG | SYSTOLIC BLOOD PRESSURE: 100 MMHG

## 2022-02-07 DIAGNOSIS — U07.1 COVID-19: ICD-10-CM

## 2022-02-07 DIAGNOSIS — R53.83 OTHER FATIGUE: ICD-10-CM

## 2022-02-07 PROCEDURE — 93000 ELECTROCARDIOGRAM COMPLETE: CPT

## 2022-02-07 PROCEDURE — 99214 OFFICE O/P EST MOD 30 MIN: CPT | Mod: 25

## 2022-02-07 RX ORDER — ONDANSETRON 4 MG/1
4 TABLET ORAL
Qty: 120 | Refills: 1 | Status: DISCONTINUED | COMMUNITY
Start: 2022-01-06 | End: 2022-02-07

## 2022-02-07 RX ORDER — ONDANSETRON 4 MG/1
4 TABLET ORAL
Qty: 15 | Refills: 0 | Status: DISCONTINUED | COMMUNITY
Start: 2020-01-21 | End: 2022-02-07

## 2022-02-07 RX ORDER — ALBENDAZOLE 200 MG/1
200 TABLET ORAL
Qty: 4 | Refills: 0 | Status: DISCONTINUED | COMMUNITY
Start: 2021-07-02 | End: 2022-02-07

## 2022-02-07 RX ORDER — GABAPENTIN 100 MG/1
100 CAPSULE ORAL
Qty: 30 | Refills: 11 | Status: DISCONTINUED | COMMUNITY
Start: 2021-09-29 | End: 2022-02-07

## 2022-02-13 PROBLEM — R53.83 FATIGUE: Status: ACTIVE | Noted: 2022-02-07

## 2022-02-13 PROBLEM — U07.1 ACUTE COVID-19: Status: ACTIVE | Noted: 2022-01-06

## 2022-02-13 LAB
ALBUMIN SERPL ELPH-MCNC: 4.7 G/DL
ALP BLD-CCNC: 43 U/L
ALT SERPL-CCNC: 18 U/L
ANION GAP SERPL CALC-SCNC: 16 MMOL/L
AST SERPL-CCNC: 19 U/L
BASOPHILS # BLD AUTO: 0.03 K/UL
BASOPHILS NFR BLD AUTO: 0.5 %
BILIRUB SERPL-MCNC: 0.4 MG/DL
BUN SERPL-MCNC: 12 MG/DL
CALCIUM SERPL-MCNC: 9.5 MG/DL
CHLORIDE SERPL-SCNC: 102 MMOL/L
CHOLEST SERPL-MCNC: 145 MG/DL
CO2 SERPL-SCNC: 24 MMOL/L
CREAT SERPL-MCNC: 0.88 MG/DL
CRP SERPL-MCNC: <3 MG/L
DEPRECATED D DIMER PPP IA-ACNC: <150 NG/ML DDU
EOSINOPHIL # BLD AUTO: 0.04 K/UL
EOSINOPHIL NFR BLD AUTO: 0.6 %
ERYTHROCYTE [SEDIMENTATION RATE] IN BLOOD BY WESTERGREN METHOD: 2 MM/HR
ESTIMATED AVERAGE GLUCOSE: 105 MG/DL
FERRITIN SERPL-MCNC: 23 NG/ML
GLUCOSE SERPL-MCNC: 81 MG/DL
HBA1C MFR BLD HPLC: 5.3 %
HCT VFR BLD CALC: 39.9 %
HDLC SERPL-MCNC: 54 MG/DL
HGB BLD-MCNC: 12.6 G/DL
IMM GRANULOCYTES NFR BLD AUTO: 0.3 %
IRON SATN MFR SERPL: 28 %
IRON SERPL-MCNC: 112 UG/DL
LDLC SERPL CALC-MCNC: 80 MG/DL
LYMPHOCYTES # BLD AUTO: 2 K/UL
LYMPHOCYTES NFR BLD AUTO: 30.3 %
MAN DIFF?: NORMAL
MCHC RBC-ENTMCNC: 29 PG
MCHC RBC-ENTMCNC: 31.6 GM/DL
MCV RBC AUTO: 91.7 FL
MONOCYTES # BLD AUTO: 0.42 K/UL
MONOCYTES NFR BLD AUTO: 6.4 %
NEUTROPHILS # BLD AUTO: 4.1 K/UL
NEUTROPHILS NFR BLD AUTO: 61.9 %
NONHDLC SERPL-MCNC: 91 MG/DL
PLATELET # BLD AUTO: 207 K/UL
POTASSIUM SERPL-SCNC: 4.3 MMOL/L
PROT SERPL-MCNC: 6.4 G/DL
RBC # BLD: 4.35 M/UL
RBC # FLD: 13.1 %
SODIUM SERPL-SCNC: 142 MMOL/L
TIBC SERPL-MCNC: 394 UG/DL
TRIGL SERPL-MCNC: 56 MG/DL
TSH SERPL-ACNC: 1.67 UIU/ML
UIBC SERPL-MCNC: 282 UG/DL
VIT B12 SERPL-MCNC: 407 PG/ML
WBC # FLD AUTO: 6.61 K/UL

## 2022-02-13 NOTE — HISTORY OF PRESENT ILLNESS
[de-identified] : Patient concerned that she has been feeling still fatigued after testing positive for Covid 1 month ago on January 7, 2022 . Patient had fever, coughing with bronchospasm, shortness of breath with some nausea but no diarrhea.  Patient had received the Pfizer vaccine in March and April but did not get the procedure patient reports symptoms resolved in a few days but returns 1 month later now because she feels she gets tired very easily and feels short of breath when exerting herself going up several flights of stairs but no chest pain. Patient still menstruating.  Cough improved. No diarrhea.\par Hx iron deficiency anemia.

## 2022-02-13 NOTE — REVIEW OF SYSTEMS
[Dyspnea on Exertion] : dyspnea on exertion [Negative] : Gastrointestinal [Fever] : no fever [Chills] : no chills [Night Sweats] : no night sweats [Recent Change In Weight] : ~T no recent weight change [Wheezing] : no wheezing [Cough] : no cough [FreeTextEntry6] : winded more easily

## 2022-02-17 ENCOUNTER — NON-APPOINTMENT (OUTPATIENT)
Age: 47
End: 2022-02-17

## 2022-02-18 ENCOUNTER — NON-APPOINTMENT (OUTPATIENT)
Age: 47
End: 2022-02-18

## 2022-02-28 ENCOUNTER — TRANSCRIPTION ENCOUNTER (OUTPATIENT)
Age: 47
End: 2022-02-28

## 2022-02-28 ENCOUNTER — NON-APPOINTMENT (OUTPATIENT)
Age: 47
End: 2022-02-28

## 2022-03-01 ENCOUNTER — TRANSCRIPTION ENCOUNTER (OUTPATIENT)
Age: 47
End: 2022-03-01

## 2022-05-16 ENCOUNTER — APPOINTMENT (OUTPATIENT)
Dept: MRI IMAGING | Facility: IMAGING CENTER | Age: 47
End: 2022-05-16
Payer: COMMERCIAL

## 2022-05-16 ENCOUNTER — OUTPATIENT (OUTPATIENT)
Dept: OUTPATIENT SERVICES | Facility: HOSPITAL | Age: 47
LOS: 1 days | End: 2022-05-16
Payer: COMMERCIAL

## 2022-05-16 DIAGNOSIS — Z00.8 ENCOUNTER FOR OTHER GENERAL EXAMINATION: ICD-10-CM

## 2022-05-16 DIAGNOSIS — Z98.89 OTHER SPECIFIED POSTPROCEDURAL STATES: Chronic | ICD-10-CM

## 2022-05-16 DIAGNOSIS — Z98.890 OTHER SPECIFIED POSTPROCEDURAL STATES: Chronic | ICD-10-CM

## 2022-05-16 PROCEDURE — C8937: CPT

## 2022-05-16 PROCEDURE — C8908: CPT

## 2022-05-16 PROCEDURE — 77049 MRI BREAST C-+ W/CAD BI: CPT | Mod: 26

## 2022-05-16 PROCEDURE — A9585: CPT

## 2022-08-15 ENCOUNTER — APPOINTMENT (OUTPATIENT)
Dept: INTERNAL MEDICINE | Facility: CLINIC | Age: 47
End: 2022-08-15

## 2022-08-29 NOTE — HISTORY OF PRESENT ILLNESS
[de-identified] : CONSULTATION REPORT\par Referred by Dr. Ortiz for evaluation of right elbow pain\par \par 45 year old female RHD presents today with right elbow pain since December 2020. No injury reported. The pain is intermittent brought on with lifting. Localizes pain to the lateral aspect of the the elbow. Was seen by PCP and recommended to use the counterforce brace with no relief. Has not taken any pain medication. Denies numbness or tingling. \par \par The patient's past medical history, past surgical history, medications and allergies were reviewed by me today with the patient and documented accordingly. In addition, the patient's family and social history, which were noncontributory to this visit, were reviewed also.  mammogram

## 2022-10-05 ENCOUNTER — NON-APPOINTMENT (OUTPATIENT)
Age: 47
End: 2022-10-05

## 2022-10-05 ENCOUNTER — APPOINTMENT (OUTPATIENT)
Dept: INTERNAL MEDICINE | Facility: CLINIC | Age: 47
End: 2022-10-05

## 2022-10-05 VITALS
DIASTOLIC BLOOD PRESSURE: 70 MMHG | HEIGHT: 62 IN | WEIGHT: 115 LBS | BODY MASS INDEX: 21.16 KG/M2 | SYSTOLIC BLOOD PRESSURE: 130 MMHG | HEART RATE: 74 BPM | OXYGEN SATURATION: 98 %

## 2022-10-05 DIAGNOSIS — E61.1 IRON DEFICIENCY: ICD-10-CM

## 2022-10-05 DIAGNOSIS — Z12.11 ENCOUNTER FOR SCREENING FOR MALIGNANT NEOPLASM OF COLON: ICD-10-CM

## 2022-10-05 DIAGNOSIS — Z23 ENCOUNTER FOR IMMUNIZATION: ICD-10-CM

## 2022-10-05 DIAGNOSIS — I73.00 RAYNAUD'S SYNDROME W/OUT GANGRENE: ICD-10-CM

## 2022-10-05 PROCEDURE — 99396 PREV VISIT EST AGE 40-64: CPT | Mod: 25

## 2022-10-05 PROCEDURE — 93000 ELECTROCARDIOGRAM COMPLETE: CPT | Mod: 59

## 2022-10-05 PROCEDURE — G0008: CPT

## 2022-10-05 PROCEDURE — 90686 IIV4 VACC NO PRSV 0.5 ML IM: CPT

## 2022-10-05 PROCEDURE — G0444 DEPRESSION SCREEN ANNUAL: CPT | Mod: 59

## 2022-10-06 LAB
25(OH)D3 SERPL-MCNC: 59.7 NG/ML
ALBUMIN SERPL ELPH-MCNC: 4.8 G/DL
ALP BLD-CCNC: 40 U/L
ALT SERPL-CCNC: 14 U/L
ANA SER IF-ACNC: NEGATIVE
ANION GAP SERPL CALC-SCNC: 10 MMOL/L
AST SERPL-CCNC: 18 U/L
BASOPHILS # BLD AUTO: 0.04 K/UL
BASOPHILS NFR BLD AUTO: 0.6 %
BILIRUB SERPL-MCNC: 0.3 MG/DL
BUN SERPL-MCNC: 12 MG/DL
CALCIUM SERPL-MCNC: 9.9 MG/DL
CHLORIDE SERPL-SCNC: 104 MMOL/L
CHOLEST SERPL-MCNC: 163 MG/DL
CO2 SERPL-SCNC: 24 MMOL/L
CREAT SERPL-MCNC: 0.81 MG/DL
EGFR: 90 ML/MIN/1.73M2
EOSINOPHIL # BLD AUTO: 0.1 K/UL
EOSINOPHIL NFR BLD AUTO: 1.4 %
ESTIMATED AVERAGE GLUCOSE: 100 MG/DL
FERRITIN SERPL-MCNC: 23 NG/ML
GLUCOSE SERPL-MCNC: 80 MG/DL
HBA1C MFR BLD HPLC: 5.1 %
HCT VFR BLD CALC: 41.5 %
HDLC SERPL-MCNC: 55 MG/DL
HGB BLD-MCNC: 13.3 G/DL
IMM GRANULOCYTES NFR BLD AUTO: 0.3 %
IRON SATN MFR SERPL: 9 %
IRON SERPL-MCNC: 42 UG/DL
LDLC SERPL CALC-MCNC: 97 MG/DL
LYMPHOCYTES # BLD AUTO: 2.62 K/UL
LYMPHOCYTES NFR BLD AUTO: 37.4 %
MAN DIFF?: NORMAL
MCHC RBC-ENTMCNC: 29.4 PG
MCHC RBC-ENTMCNC: 32 GM/DL
MCV RBC AUTO: 91.6 FL
MONOCYTES # BLD AUTO: 0.57 K/UL
MONOCYTES NFR BLD AUTO: 8.1 %
NEUTROPHILS # BLD AUTO: 3.66 K/UL
NEUTROPHILS NFR BLD AUTO: 52.2 %
NONHDLC SERPL-MCNC: 108 MG/DL
PLATELET # BLD AUTO: 217 K/UL
POTASSIUM SERPL-SCNC: 4.2 MMOL/L
PROT SERPL-MCNC: 6.7 G/DL
RBC # BLD: 4.53 M/UL
RBC # FLD: 13.2 %
SODIUM SERPL-SCNC: 139 MMOL/L
TIBC SERPL-MCNC: 446 UG/DL
TRIGL SERPL-MCNC: 57 MG/DL
TSH SERPL-ACNC: 2.63 UIU/ML
UIBC SERPL-MCNC: 404 UG/DL
WBC # FLD AUTO: 7.01 K/UL

## 2022-10-07 PROBLEM — E61.1 IRON DEFICIENCY: Status: ACTIVE | Noted: 2018-06-13

## 2022-10-07 PROBLEM — I73.00 RAYNAUD PHENOMENON: Status: ACTIVE | Noted: 2022-10-05

## 2022-10-07 LAB — DSDNA AB SER-ACNC: <12 IU/ML

## 2022-10-07 NOTE — HISTORY OF PRESENT ILLNESS
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 notes she often has little time to stay hydrated and does not layer in winter\par

## 2022-10-07 NOTE — HEALTH RISK ASSESSMENT
[Yes] : Yes [2 - 4 times a month (2 pts)] : 2-4 times a month (2 points) [No falls in past year] : Patient reported no falls in the past year [0] : 2) Feeling down, depressed, or hopeless: Not at all (0) [PHQ-2 Negative - No further assessment needed] : PHQ-2 Negative - No further assessment needed [Never] : Never [Audit-CScore] : 2 [[id number]] : 0

## 2022-10-25 ENCOUNTER — RX RENEWAL (OUTPATIENT)
Age: 47
End: 2022-10-25

## 2022-12-08 ENCOUNTER — OUTPATIENT (OUTPATIENT)
Dept: OUTPATIENT SERVICES | Facility: HOSPITAL | Age: 47
LOS: 1 days | Discharge: HOME | End: 2022-12-08

## 2022-12-08 DIAGNOSIS — Z98.890 OTHER SPECIFIED POSTPROCEDURAL STATES: Chronic | ICD-10-CM

## 2022-12-08 DIAGNOSIS — Z98.89 OTHER SPECIFIED POSTPROCEDURAL STATES: Chronic | ICD-10-CM

## 2022-12-08 DIAGNOSIS — C50.919 MALIGNANT NEOPLASM OF UNSPECIFIED SITE OF UNSPECIFIED FEMALE BREAST: ICD-10-CM

## 2022-12-14 ENCOUNTER — APPOINTMENT (OUTPATIENT)
Dept: HEMATOLOGY ONCOLOGY | Facility: CLINIC | Age: 47
End: 2022-12-14

## 2022-12-14 VITALS
DIASTOLIC BLOOD PRESSURE: 71 MMHG | WEIGHT: 119.05 LBS | SYSTOLIC BLOOD PRESSURE: 105 MMHG | HEART RATE: 99 BPM | BODY MASS INDEX: 21.77 KG/M2 | TEMPERATURE: 97.1 F | RESPIRATION RATE: 18 BRPM | OXYGEN SATURATION: 99 %

## 2022-12-14 PROCEDURE — 99214 OFFICE O/P EST MOD 30 MIN: CPT

## 2022-12-14 NOTE — HISTORY OF PRESENT ILLNESS
[de-identified] : LCIS of the right breast at age 45\par 04/01/19 Mammogram and breast ultrasound showed probably benign new clusters of calcifications in the right upper and right upper outer breast, for which six month mammographic follow-up is recommended. The breast parenchyma demonstrates a heterogeneous background echotexture (mixed fatty and \par fibroglandular).  2:00, 4 cm from the nipple, 7 x 3 x 7 mm hypoechoic nodule is stable since June 2017. 9:00 to 10:00, 6 cm from the nipple, 13 x 7 x 20 mm cyst cluster is essentially stable since June 2017. There are multiple scattered cyst clusters and benign cysts measuring up to 16 mm in size at 12:00, 3 cm from \par the nipple. \par 11/27/19 Right mammogram showed extremely dense breast. Grouping of indeterminate calcifications in the central outer aspect middle depth for which \par stereotactic biopsy is recommended. Multiple newly seen fine indeterminate calcifications in the upper outer posterior right breast for which stereotactic biopsy was recommended.  Small grouping of calcifications in the upper central aspect middle-posterior depth which are without significant change. 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 \par        Right breast central outer quadrant, stereotactic vacuum assisted core biopsy lobular carcinoma in situ and flat epithelial atypia. 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  Multiple foci of microcalcifications present in columnar cell change, and fibrocystic changes\par 01/25/20 Right upper central  breast stereotactic core biopsy showed proliferative and non proliferative fibrocystic disease including columnar cell hyperplasia, adenosis and microcysts and fibrosis. Microcalcification associated with columnar cell hyperplasia.\par 05/09/20 MRI of the breast showed marked background enhancement limits sensitivity of MRI. No dominant suspicious enhancement in the outer right breast at site of biopsy-proven LCIS and atypia. Recommend appropriate surgical management.\par 06/01/20 Right breast lumpectomy showed LCIS, classic type, extensive usual ductal hyperplasia, flat epithelial atypia, PASH and fibrocystic changes. Interior margin right breast excision columnar cell hyperplasia with microcalcifications, hemangioma and ALH\par 9/20 Tamoxifen started [de-identified] : SHAHID [de-identified] : Madison started tamoxifen in 9/20 for breast cancer prevention following the diagnosis of LCIS. She is tolerating it fairly well aside from hot flashes at night, which wake her up once or twice and require changing her pajamas. She tried gabapentin 300 mg qhs but it made her groggy the next morning. She did not try the 100 mg prescribed at her last visit.\par She is not having any vaginal dryness or discharge.  She continues to have periods which are now more irregular, coming every 30-60 days.. 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  A new 0.3 x 0.3 x 0.3 cm hypoechoic mass or cyst cluster within the right breast at 7:00, 5 cm from the nipple. Recommend targeted ultrasound for      further evaluation. Sonogram was done and no biopsy\par Breast MRI: 5/16/22 BI-RADS 2 \par Pap smear: 3/1/19 negative; 2022 with Dr. Win\par Colonoscopy: none\par Genetic testing: Mother had breast cancer at age 51 and again at age 60, maternal aunt had LCIS at age 60; discussed value of genetic testing\par

## 2023-02-21 DIAGNOSIS — Z91.89 OTHER SPECIFIED PERSONAL RISK FACTORS, NOT ELSEWHERE CLASSIFIED: ICD-10-CM

## 2023-02-21 DIAGNOSIS — Z80.3 FAMILY HISTORY OF MALIGNANT NEOPLASM OF BREAST: ICD-10-CM

## 2023-02-23 ENCOUNTER — RX RENEWAL (OUTPATIENT)
Age: 48
End: 2023-02-23

## 2023-05-22 ENCOUNTER — APPOINTMENT (OUTPATIENT)
Dept: MRI IMAGING | Facility: IMAGING CENTER | Age: 48
End: 2023-05-22
Payer: COMMERCIAL

## 2023-05-22 ENCOUNTER — RESULT REVIEW (OUTPATIENT)
Age: 48
End: 2023-05-22

## 2023-05-22 ENCOUNTER — APPOINTMENT (OUTPATIENT)
Dept: MAMMOGRAPHY | Facility: IMAGING CENTER | Age: 48
End: 2023-05-22
Payer: COMMERCIAL

## 2023-05-22 ENCOUNTER — OUTPATIENT (OUTPATIENT)
Dept: OUTPATIENT SERVICES | Facility: HOSPITAL | Age: 48
LOS: 1 days | End: 2023-05-22
Payer: COMMERCIAL

## 2023-05-22 ENCOUNTER — APPOINTMENT (OUTPATIENT)
Dept: ULTRASOUND IMAGING | Facility: IMAGING CENTER | Age: 48
End: 2023-05-22
Payer: COMMERCIAL

## 2023-05-22 DIAGNOSIS — Z98.890 OTHER SPECIFIED POSTPROCEDURAL STATES: Chronic | ICD-10-CM

## 2023-05-22 DIAGNOSIS — Z98.89 OTHER SPECIFIED POSTPROCEDURAL STATES: Chronic | ICD-10-CM

## 2023-05-22 DIAGNOSIS — R92.0 MAMMOGRAPHIC MICROCALCIFICATION FOUND ON DIAGNOSTIC IMAGING OF BREAST: ICD-10-CM

## 2023-05-22 PROCEDURE — 77065 DX MAMMO INCL CAD UNI: CPT | Mod: 26,RT

## 2023-05-22 PROCEDURE — 77065 DX MAMMO INCL CAD UNI: CPT

## 2023-05-22 PROCEDURE — 77049 MRI BREAST C-+ W/CAD BI: CPT | Mod: 26

## 2023-05-22 PROCEDURE — C8908: CPT

## 2023-05-22 PROCEDURE — G0279: CPT | Mod: 26

## 2023-05-22 PROCEDURE — G0279: CPT

## 2023-05-22 PROCEDURE — A9585: CPT

## 2023-05-22 PROCEDURE — C8937: CPT

## 2023-05-23 ENCOUNTER — NON-APPOINTMENT (OUTPATIENT)
Age: 48
End: 2023-05-23

## 2023-06-05 ENCOUNTER — OUTPATIENT (OUTPATIENT)
Dept: OUTPATIENT SERVICES | Facility: HOSPITAL | Age: 48
LOS: 1 days | Discharge: HOME | End: 2023-06-05

## 2023-06-05 DIAGNOSIS — Z98.89 OTHER SPECIFIED POSTPROCEDURAL STATES: Chronic | ICD-10-CM

## 2023-06-05 DIAGNOSIS — Z98.890 OTHER SPECIFIED POSTPROCEDURAL STATES: Chronic | ICD-10-CM

## 2023-06-05 DIAGNOSIS — C50.919 MALIGNANT NEOPLASM OF UNSPECIFIED SITE OF UNSPECIFIED FEMALE BREAST: ICD-10-CM

## 2023-06-12 ENCOUNTER — APPOINTMENT (OUTPATIENT)
Dept: PLASTIC SURGERY | Facility: CLINIC | Age: 48
End: 2023-06-12
Payer: COMMERCIAL

## 2023-06-12 ENCOUNTER — RESULT REVIEW (OUTPATIENT)
Age: 48
End: 2023-06-12

## 2023-06-12 VITALS
HEART RATE: 75 BPM | DIASTOLIC BLOOD PRESSURE: 66 MMHG | HEIGHT: 63 IN | OXYGEN SATURATION: 99 % | SYSTOLIC BLOOD PRESSURE: 99 MMHG | TEMPERATURE: 98.1 F | BODY MASS INDEX: 19.84 KG/M2 | WEIGHT: 112 LBS

## 2023-06-12 DIAGNOSIS — R92.0 MAMMOGRAPHIC MICROCALCIFICATION FOUND ON DIAGNOSTIC IMAGING OF BREAST: ICD-10-CM

## 2023-06-12 PROCEDURE — 99213 OFFICE O/P EST LOW 20 MIN: CPT

## 2023-06-12 NOTE — ASSESSMENT
[FreeTextEntry1] : History of right breast LCIS, ALH & FEA (5/2020)\par Clinical breast exam benign \par \par 1. Annual bilateral mammogram and breast ultrasound due 11/2023; Rx provided.\par 2. Follow up office visit due in 1 year\par 3. Advised monthly self breast examinations and advised her to contact me if she has any concerns. \par 4. Bilateral breast MRI due 5/2024

## 2023-06-12 NOTE — CONSULT LETTER
[Dear  ___] : Dear  [unfilled], [Courtesy Letter:] : I had the pleasure of seeing your patient, [unfilled], in my office today. [Please see my note below.] : Please see my note below. [Sincerely,] : Sincerely, [DrMarry  ___] : Dr. CHEN [FreeTextEntry3] : Susan M. Palleschi, MD, FACS\par Division of Breast Surgery\par Director, Breast Surgery\par [facility] [place]\par [address] [place] [address]\par [address]\[place] [address]\par (Phone) [phone]\par (Fax) [phone]

## 2023-06-12 NOTE — PHYSICAL EXAM

## 2023-06-12 NOTE — HISTORY OF PRESENT ILLNESS
[FreeTextEntry1] : Patient is a 48 year old female here today for a follow up visit. \par Family history of breast cancer in her mother, diagnosed in her late 40's then had a recurrence at age 60. Her mother's genetic testing was negative. \par She has a history of right breast LCIS, flat epithelial atypia and atypical lobular hyperplasia, s/p right surgical excisional biopsy with Dr. Mallory on 5/28/2020. Excisional margins at the time of surgery revealed ALH at the inferior margin. \par Med Onc: Dr. Chao; last seen 12/14/22. She is scheduled to see her next on 6/15/22. Tamoxifen started 9/2020, planned to end 9/2025. \par 11/7/2022 Bilateral mammogram: No significant masses, calcifications, or other findings are seen in the left breast. There were several isolated and grouped calcifications noted in the right upper outer quadrant in the vicinity of a metallic marker placed at the time of a prior stereotactic biopsy for calcifications which yielded benign results. \par Magnification images of the right upper outer quadrant to follow. BI-RADS 0. \par 11/7/2022 Right callback mammogram: There is stable scarring in the right upper outer quadrant. There are several groupings of punctate and round calcifications in the right upper outer posterior breast in the vicinity of a metallic marker at the site of a prior stereotactic biopsy for calcifications which yielded benign results. Calcifications appear similar in morphology to previously biopsied calcifications which yielded benign results. Recommend a follow up mammogram with comparable magnification images in 6 months to establish a pattern of stability. \par Bilateral breast ultrasound: No suspicious abnormalities were seen sonographically in either breast. There are bilateral scattered cysts and cyst clusters ranging from 2-22 mm. In real time today, the previously noted finding on an outside US in the right breast at 7:00 (5cmFN) is consistent with benign cyst cluster. BI-RADS 3. \par 5/22/2023 Right breast mammogram: No suspicious mass, suspicious microcalcifications, or other sign of malignancy is identified.\par Postsurgical changes visualized in the upper outer breast.\par 2 biopsy markers are visualized in the upper outer quadrant.\par The standard mammographic views appear stable.\par Magnification views of 11/27/2019 are available for comparison.\par Multiple scattered and grouped calcifications visualized are visualized predominantly in the upper outer quadrant and are similar in appearance to 2019.\par Recommend mammography in 6 months. BI-RADS 3. \par 5/22/2023 Bilateral breast MRI: Right breast- There is no suspicious enhancement in the right breast. There are stable scattered enhancing nonspecific foci.  There are two signal void from biopsy markers in the upper outer and upper central right breast. Stable minimal postsurgical changes of the right breast.\par Left breast- There is no suspicious enhancement in the left breast.  There are stable scattered enhancing nonspecific foci.\par There is no significant axillary or internal mammary lymphadenopathy. Resume annual mammography on schedule. BI-RADS 2. \par She is doing well and otherwise denies any breast concerns at this time. \par She performs self breast examinations and denies any palpable masses but notes her breasts have always felt "lumpy". \par She came alone. \par

## 2023-07-13 ENCOUNTER — APPOINTMENT (OUTPATIENT)
Dept: HEMATOLOGY ONCOLOGY | Facility: CLINIC | Age: 48
End: 2023-07-13
Payer: COMMERCIAL

## 2023-07-13 VITALS
BODY MASS INDEX: 19.92 KG/M2 | OXYGEN SATURATION: 99 % | DIASTOLIC BLOOD PRESSURE: 66 MMHG | SYSTOLIC BLOOD PRESSURE: 99 MMHG | RESPIRATION RATE: 19 BRPM | WEIGHT: 112.44 LBS | TEMPERATURE: 97.2 F | HEART RATE: 89 BPM

## 2023-07-13 DIAGNOSIS — Z13.71 ENCOUNTER FOR NONPROCREATIVE GENETIC COUNSELING: ICD-10-CM

## 2023-07-13 DIAGNOSIS — Z71.83 ENCOUNTER FOR NONPROCREATIVE GENETIC COUNSELING: ICD-10-CM

## 2023-07-13 PROCEDURE — 99215 OFFICE O/P EST HI 40 MIN: CPT

## 2023-07-16 PROBLEM — Z71.83 ENCOUNTER FOR NONPROCREATIVE GENETIC COUNSELING AND TESTING: Status: ACTIVE | Noted: 2023-07-16

## 2023-07-16 NOTE — HISTORY OF PRESENT ILLNESS
[de-identified] : LCIS of the right breast at age 45\par 04/01/19 Mammogram and breast ultrasound showed probably benign new clusters of calcifications in the right upper and right upper outer breast, for which six month mammographic follow-up is recommended. The breast parenchyma demonstrates a heterogeneous background echotexture (mixed fatty and \par fibroglandular).  2:00, 4 cm from the nipple, 7 x 3 x 7 mm hypoechoic nodule is stable since June 2017. 9:00 to 10:00, 6 cm from the nipple, 13 x 7 x 20 mm cyst cluster is essentially stable since June 2017. There are multiple scattered cyst clusters and benign cysts measuring up to 16 mm in size at 12:00, 3 cm from \par the nipple. \par 11/27/19 Right mammogram showed extremely dense breast. Grouping of indeterminate calcifications in the central outer aspect middle depth for which \par stereotactic biopsy is recommended. Multiple newly seen fine indeterminate calcifications in the upper outer posterior right breast for which stereotactic biopsy was recommended.  Small grouping of calcifications in the upper central aspect middle-posterior depth which are without significant change. 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 \par        Right breast central outer quadrant, stereotactic vacuum assisted core biopsy lobular carcinoma in situ and flat epithelial atypia. 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  Multiple foci of microcalcifications present in columnar cell change, and fibrocystic changes\par 01/25/20 Right upper central  breast stereotactic core biopsy showed proliferative and non proliferative fibrocystic disease including columnar cell hyperplasia, adenosis and microcysts and fibrosis. Microcalcification associated with columnar cell hyperplasia.\par 05/09/20 MRI of the breast showed marked background enhancement limits sensitivity of MRI. No dominant suspicious enhancement in the outer right breast at site of biopsy-proven LCIS and atypia. Recommend appropriate surgical management.\par 06/01/20 Right breast lumpectomy showed LCIS, classic type, extensive usual ductal hyperplasia, flat epithelial atypia, PASH and fibrocystic changes. Interior margin right breast excision columnar cell hyperplasia with microcalcifications, hemangioma and ALH\par 9/20 Tamoxifen started [de-identified] : SHAHID [de-identified] : Madison started tamoxifen in 9/20 for breast cancer prevention following the diagnosis of LCIS. She is tolerating it fairly well aside from hot flashes at night, which wake her up once or twice and require changing her pajamas. She had tried gabapentin 300mg QHS, but it made her very groggy the next day. She has not tried the 100mg and is just coping with the night sweats and hot flashes so far.\par She continues to have periods which are now more irregular, coming every 30-60 days. LMP 6/10/23, usually heavy bleeding x 5 days \par She is not having any vaginal dryness or discharge.  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 Win\par Colonoscopy: none, Referred to see Dr. Barfield\par Genetic testing: Mother had breast cancer at age 51 and again at age 60, maternal aunt had LCIS at age 60; discussed value of genetic testing\par

## 2023-07-24 ENCOUNTER — RX RENEWAL (OUTPATIENT)
Age: 48
End: 2023-07-24

## 2023-07-31 LAB
MISCELLANEOUS TEST: NORMAL
PROC NAME: NORMAL

## 2023-09-12 ENCOUNTER — TRANSCRIPTION ENCOUNTER (OUTPATIENT)
Age: 48
End: 2023-09-12

## 2023-09-13 ENCOUNTER — APPOINTMENT (OUTPATIENT)
Dept: OBGYN | Facility: CLINIC | Age: 48
End: 2023-09-13
Payer: COMMERCIAL

## 2023-09-13 ENCOUNTER — TRANSCRIPTION ENCOUNTER (OUTPATIENT)
Age: 48
End: 2023-09-13

## 2023-09-13 VITALS
HEIGHT: 63 IN | SYSTOLIC BLOOD PRESSURE: 107 MMHG | WEIGHT: 120.25 LBS | BODY MASS INDEX: 21.3 KG/M2 | DIASTOLIC BLOOD PRESSURE: 72 MMHG

## 2023-09-13 DIAGNOSIS — Z01.419 ENCOUNTER FOR GYNECOLOGICAL EXAMINATION (GENERAL) (ROUTINE) W/OUT ABNORMAL FINDINGS: ICD-10-CM

## 2023-09-13 DIAGNOSIS — Z87.42 PERSONAL HISTORY OF OTHER DISEASES OF THE FEMALE GENITAL TRACT: ICD-10-CM

## 2023-09-13 PROCEDURE — 99396 PREV VISIT EST AGE 40-64: CPT

## 2023-09-14 LAB — HPV HIGH+LOW RISK DNA PNL CVX: NOT DETECTED

## 2023-09-18 LAB — CYTOLOGY CVX/VAG DOC THIN PREP: NORMAL

## 2023-11-01 ENCOUNTER — APPOINTMENT (OUTPATIENT)
Dept: GASTROENTEROLOGY | Facility: CLINIC | Age: 48
End: 2023-11-01
Payer: COMMERCIAL

## 2023-11-01 ENCOUNTER — NON-APPOINTMENT (OUTPATIENT)
Age: 48
End: 2023-11-01

## 2023-11-01 VITALS
BODY MASS INDEX: 20.55 KG/M2 | HEART RATE: 75 BPM | WEIGHT: 116 LBS | DIASTOLIC BLOOD PRESSURE: 68 MMHG | HEIGHT: 63 IN | OXYGEN SATURATION: 98 % | SYSTOLIC BLOOD PRESSURE: 115 MMHG

## 2023-11-01 DIAGNOSIS — Z12.11 ENCOUNTER FOR SCREENING FOR MALIGNANT NEOPLASM OF COLON: ICD-10-CM

## 2023-11-01 PROCEDURE — 99203 OFFICE O/P NEW LOW 30 MIN: CPT

## 2023-11-01 RX ORDER — BUDESONIDE AND FORMOTEROL FUMARATE DIHYDRATE 160; 4.5 UG/1; UG/1
160-4.5 AEROSOL RESPIRATORY (INHALATION) TWICE DAILY
Qty: 1 | Refills: 1 | Status: DISCONTINUED | COMMUNITY
Start: 2022-01-06 | End: 2023-11-01

## 2023-11-01 RX ORDER — ALBUTEROL SULFATE 90 UG/1
108 (90 BASE) INHALANT RESPIRATORY (INHALATION)
Qty: 1 | Refills: 0 | Status: DISCONTINUED | COMMUNITY
Start: 2022-01-06 | End: 2023-11-01

## 2023-11-01 RX ORDER — FAMOTIDINE 20 MG/1
20 TABLET, FILM COATED ORAL
Qty: 60 | Refills: 2 | Status: DISCONTINUED | COMMUNITY
Start: 2022-01-06 | End: 2023-11-01

## 2023-11-17 ENCOUNTER — RESULT REVIEW (OUTPATIENT)
Age: 48
End: 2023-11-17

## 2023-11-17 ENCOUNTER — OUTPATIENT (OUTPATIENT)
Dept: OUTPATIENT SERVICES | Facility: HOSPITAL | Age: 48
LOS: 1 days | End: 2023-11-17
Payer: COMMERCIAL

## 2023-11-17 ENCOUNTER — APPOINTMENT (OUTPATIENT)
Dept: ULTRASOUND IMAGING | Facility: IMAGING CENTER | Age: 48
End: 2023-11-17
Payer: COMMERCIAL

## 2023-11-17 ENCOUNTER — APPOINTMENT (OUTPATIENT)
Dept: MAMMOGRAPHY | Facility: IMAGING CENTER | Age: 48
End: 2023-11-17
Payer: COMMERCIAL

## 2023-11-17 DIAGNOSIS — Z98.89 OTHER SPECIFIED POSTPROCEDURAL STATES: Chronic | ICD-10-CM

## 2023-11-17 DIAGNOSIS — N60.91 UNSPECIFIED BENIGN MAMMARY DYSPLASIA OF RIGHT BREAST: ICD-10-CM

## 2023-11-17 DIAGNOSIS — D05.01 LOBULAR CARCINOMA IN SITU OF RIGHT BREAST: ICD-10-CM

## 2023-11-17 DIAGNOSIS — Z00.8 ENCOUNTER FOR OTHER GENERAL EXAMINATION: ICD-10-CM

## 2023-11-17 DIAGNOSIS — Z91.89 OTHER SPECIFIED PERSONAL RISK FACTORS, NOT ELSEWHERE CLASSIFIED: ICD-10-CM

## 2023-11-17 DIAGNOSIS — Z98.890 OTHER SPECIFIED POSTPROCEDURAL STATES: Chronic | ICD-10-CM

## 2023-11-17 DIAGNOSIS — N60.81 OTHER BENIGN MAMMARY DYSPLASIAS OF RIGHT BREAST: ICD-10-CM

## 2023-11-17 DIAGNOSIS — Z80.3 FAMILY HISTORY OF MALIGNANT NEOPLASM OF BREAST: ICD-10-CM

## 2023-11-17 DIAGNOSIS — R92.0 MAMMOGRAPHIC MICROCALCIFICATION FOUND ON DIAGNOSTIC IMAGING OF BREAST: ICD-10-CM

## 2023-11-17 PROCEDURE — 77067 SCR MAMMO BI INCL CAD: CPT

## 2023-11-17 PROCEDURE — 77063 BREAST TOMOSYNTHESIS BI: CPT | Mod: 26

## 2023-11-17 PROCEDURE — 76641 ULTRASOUND BREAST COMPLETE: CPT | Mod: 26,50

## 2023-11-17 PROCEDURE — 77067 SCR MAMMO BI INCL CAD: CPT | Mod: 26

## 2023-11-17 PROCEDURE — 77063 BREAST TOMOSYNTHESIS BI: CPT

## 2023-11-17 PROCEDURE — 76641 ULTRASOUND BREAST COMPLETE: CPT

## 2023-11-22 ENCOUNTER — TRANSCRIPTION ENCOUNTER (OUTPATIENT)
Age: 48
End: 2023-11-22

## 2023-12-01 ENCOUNTER — APPOINTMENT (OUTPATIENT)
Dept: GASTROENTEROLOGY | Facility: AMBULATORY MEDICAL SERVICES | Age: 48
End: 2023-12-01
Payer: COMMERCIAL

## 2023-12-01 PROCEDURE — 45378 DIAGNOSTIC COLONOSCOPY: CPT | Mod: 33

## 2023-12-11 ENCOUNTER — APPOINTMENT (OUTPATIENT)
Dept: INTERNAL MEDICINE | Facility: CLINIC | Age: 48
End: 2023-12-11
Payer: COMMERCIAL

## 2023-12-11 VITALS
SYSTOLIC BLOOD PRESSURE: 110 MMHG | BODY MASS INDEX: 20.38 KG/M2 | WEIGHT: 115 LBS | HEIGHT: 63 IN | HEART RATE: 75 BPM | DIASTOLIC BLOOD PRESSURE: 62 MMHG | OXYGEN SATURATION: 97 %

## 2023-12-11 DIAGNOSIS — F41.9 ANXIETY DISORDER, UNSPECIFIED: ICD-10-CM

## 2023-12-11 DIAGNOSIS — J30.9 ALLERGIC RHINITIS, UNSPECIFIED: ICD-10-CM

## 2023-12-11 DIAGNOSIS — Z00.00 ENCOUNTER FOR GENERAL ADULT MEDICAL EXAMINATION W/OUT ABNORMAL FINDINGS: ICD-10-CM

## 2023-12-11 PROCEDURE — 99396 PREV VISIT EST AGE 40-64: CPT | Mod: 25

## 2023-12-11 PROCEDURE — G0444 DEPRESSION SCREEN ANNUAL: CPT | Mod: 59

## 2023-12-13 LAB
ALBUMIN SERPL ELPH-MCNC: 4.5 G/DL
ALP BLD-CCNC: 45 U/L
ALT SERPL-CCNC: 15 U/L
ANION GAP SERPL CALC-SCNC: 15 MMOL/L
AST SERPL-CCNC: 20 U/L
BILIRUB SERPL-MCNC: 0.2 MG/DL
BUN SERPL-MCNC: 11 MG/DL
CALCIUM SERPL-MCNC: 9.3 MG/DL
CHLORIDE SERPL-SCNC: 104 MMOL/L
CHOLEST SERPL-MCNC: 168 MG/DL
CO2 SERPL-SCNC: 23 MMOL/L
CREAT SERPL-MCNC: 0.85 MG/DL
EGFR: 84 ML/MIN/1.73M2
ESTIMATED AVERAGE GLUCOSE: 103 MG/DL
GLUCOSE SERPL-MCNC: 82 MG/DL
HBA1C MFR BLD HPLC: 5.2 %
HCT VFR BLD CALC: 38.5 %
HDLC SERPL-MCNC: 56 MG/DL
HGB BLD-MCNC: 12.7 G/DL
LDLC SERPL CALC-MCNC: 97 MG/DL
MCHC RBC-ENTMCNC: 30 PG
MCHC RBC-ENTMCNC: 33 GM/DL
MCV RBC AUTO: 90.8 FL
NONHDLC SERPL-MCNC: 113 MG/DL
PLATELET # BLD AUTO: 201 K/UL
POTASSIUM SERPL-SCNC: 4.1 MMOL/L
PROT SERPL-MCNC: 6.6 G/DL
RBC # BLD: 4.24 M/UL
RBC # FLD: 12.8 %
SODIUM SERPL-SCNC: 142 MMOL/L
TRIGL SERPL-MCNC: 82 MG/DL
TSH SERPL-ACNC: 1.44 UIU/ML
WBC # FLD AUTO: 6.53 K/UL

## 2023-12-13 NOTE — HISTORY OF PRESENT ILLNESS
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 Stereotactic core bx on 1/5/19, s/p right breast lumpectomy 5/28/20. Following with breast surgeon and with breast oncologist Dr Chao. Started Tamoxifen Sept 2020 for breast ca prevention following diagnosis LCIS. Getting hot flashes twice at night Periods irregular  10/2022-notes her hands and feet get extremely uncomfortable in the cold/winter season, since perimenopause on Tamoxifen, Raynauds? notes she often has little time to stay hydrated and does not layer in winter 12/1/2023 GI-colonoscopy negative 9/13/2023-gyn Dr Win-irregular menses q30-60 days, hot flashes on tamoxifen until 2025. Mammograsm 11/17/2023

## 2024-02-23 ENCOUNTER — RX RENEWAL (OUTPATIENT)
Age: 49
End: 2024-02-23

## 2024-04-25 ENCOUNTER — OUTPATIENT (OUTPATIENT)
Dept: OUTPATIENT SERVICES | Facility: HOSPITAL | Age: 49
LOS: 1 days | Discharge: HOME | End: 2024-04-25

## 2024-04-25 DIAGNOSIS — Z98.89 OTHER SPECIFIED POSTPROCEDURAL STATES: Chronic | ICD-10-CM

## 2024-04-25 DIAGNOSIS — Z98.890 OTHER SPECIFIED POSTPROCEDURAL STATES: Chronic | ICD-10-CM

## 2024-04-25 DIAGNOSIS — C50.919 MALIGNANT NEOPLASM OF UNSPECIFIED SITE OF UNSPECIFIED FEMALE BREAST: ICD-10-CM

## 2024-05-06 ENCOUNTER — APPOINTMENT (OUTPATIENT)
Dept: HEMATOLOGY ONCOLOGY | Facility: CLINIC | Age: 49
End: 2024-05-06
Payer: COMMERCIAL

## 2024-05-06 VITALS
RESPIRATION RATE: 19 BRPM | BODY MASS INDEX: 21.09 KG/M2 | DIASTOLIC BLOOD PRESSURE: 69 MMHG | WEIGHT: 119.05 LBS | OXYGEN SATURATION: 97 % | HEART RATE: 83 BPM | SYSTOLIC BLOOD PRESSURE: 109 MMHG | TEMPERATURE: 97.7 F

## 2024-05-06 DIAGNOSIS — T45.1X5A FLUSHING: ICD-10-CM

## 2024-05-06 DIAGNOSIS — R23.2 FLUSHING: ICD-10-CM

## 2024-05-06 DIAGNOSIS — R25.2 CRAMP AND SPASM: ICD-10-CM

## 2024-05-06 PROCEDURE — 99214 OFFICE O/P EST MOD 30 MIN: CPT

## 2024-05-06 PROCEDURE — G2211 COMPLEX E/M VISIT ADD ON: CPT | Mod: NC,1L

## 2024-05-06 RX ORDER — TAMOXIFEN CITRATE 20 MG/1
20 TABLET, FILM COATED ORAL
Qty: 30 | Refills: 6 | Status: ACTIVE | COMMUNITY
Start: 2020-08-18 | End: 1900-01-01

## 2024-05-06 RX ORDER — SODIUM PICOSULFATE, MAGNESIUM OXIDE, AND ANHYDROUS CITRIC ACID 10; 3.5; 12 MG/160ML; G/160ML; G/160ML
10-3.5-12 MG-GM LIQUID ORAL
Qty: 1 | Refills: 0 | Status: DISCONTINUED | COMMUNITY
Start: 2023-11-01 | End: 2024-05-06

## 2024-05-07 PROBLEM — R25.2 MUSCLE CRAMPS: Status: ACTIVE | Noted: 2023-07-16

## 2024-05-07 PROBLEM — R23.2 HOT FLASHES DUE TO TAMOXIFEN: Status: ACTIVE | Noted: 2021-02-02

## 2024-05-07 NOTE — HISTORY OF PRESENT ILLNESS
[de-identified] : LCIS of the right breast at age 45\par  04/01/19 Mammogram and breast ultrasound showed probably benign new clusters of calcifications in the right upper and right upper outer breast, for which six month mammographic follow-up is recommended. The breast parenchyma demonstrates a heterogeneous background echotexture (mixed fatty and \par  fibroglandular).  2:00, 4 cm from the nipple, 7 x 3 x 7 mm hypoechoic nodule is stable since June 2017. 9:00 to 10:00, 6 cm from the nipple, 13 x 7 x 20 mm cyst cluster is essentially stable since June 2017. There are multiple scattered cyst clusters and benign cysts measuring up to 16 mm in size at 12:00, 3 cm from \par  the nipple. \par  11/27/19 Right mammogram showed extremely dense breast. Grouping of indeterminate calcifications in the central outer aspect middle depth for which \par  stereotactic biopsy is recommended. Multiple newly seen fine indeterminate calcifications in the upper outer posterior right breast for which stereotactic biopsy was recommended.  Small grouping of calcifications in the upper central aspect middle-posterior depth which are without significant change. 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 \par         Right breast central outer quadrant, stereotactic vacuum assisted core biopsy lobular carcinoma in situ and flat epithelial atypia. 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  Multiple foci of microcalcifications present in columnar cell change, and fibrocystic changes\par  01/25/20 Right upper central  breast stereotactic core biopsy showed proliferative and non proliferative fibrocystic disease including columnar cell hyperplasia, adenosis and microcysts and fibrosis. Microcalcification associated with columnar cell hyperplasia.\par  05/09/20 MRI of the breast showed marked background enhancement limits sensitivity of MRI. No dominant suspicious enhancement in the outer right breast at site of biopsy-proven LCIS and atypia. Recommend appropriate surgical management.\par  06/01/20 Right breast lumpectomy showed LCIS, classic type, extensive usual ductal hyperplasia, flat epithelial atypia, PASH and fibrocystic changes. Interior margin right breast excision columnar cell hyperplasia with microcalcifications, hemangioma and ALH\par  9/20 Tamoxifen started [de-identified] : SHAHID [de-identified] : Madison started tamoxifen in 9/20 for breast cancer prevention following the diagnosis of LCIS.  She is tolerating it fairly well aside from hot flashes at night, which wake her up once or twice and require changing her pajamas. However over the past  couple of months she began experiencing hot flashes during the day as well, which she tries to manage the best she can. She had tried gabapentin 300mg QHS, but it made her very groggy the next day. She was then given Gabapentin 100mg and was noted no improvement in her symptoms and still feeling groggy in the mornings. She continues to have periods which are now more irregular, coming every  days. LMP 02/2024, usually heavy bleeding x 5 days  She is not having any vaginal dryness or discharge.  She c/o occasional night time muscle cramps in her feet and calves for which she has been taking a magnesium powder, which she incorporates in to her drinks. She continues to worry about cancer recurrence and tries to live a healthy balanced life style wot lower her risk the best she can. 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  HEALTH MAINTENANCE: Mammogram and sonogram: 11/17/23 BI-RADS 2  Extremely dense breasts. 5/22/23 BI-RADS 3 stable right breast calcification repeat mammogram in 6 months. Breast MRI: 5/22/23 BI-RADS 2  Pap smear: 3/1/19 negative; 2023 with Dr. Win Colonoscopy: 12/2023 negative next follow up in 10 years (Dr. Barfield) Genetic testing: Mother had breast cancer at age 51 and again at age 60, maternal aunt had LCIS at age 60; discussed value of genetic testing

## 2024-06-06 ENCOUNTER — APPOINTMENT (OUTPATIENT)
Dept: MRI IMAGING | Facility: IMAGING CENTER | Age: 49
End: 2024-06-06
Payer: COMMERCIAL

## 2024-06-06 ENCOUNTER — OUTPATIENT (OUTPATIENT)
Dept: OUTPATIENT SERVICES | Facility: HOSPITAL | Age: 49
LOS: 1 days | End: 2024-06-06
Payer: COMMERCIAL

## 2024-06-06 DIAGNOSIS — N60.91 UNSPECIFIED BENIGN MAMMARY DYSPLASIA OF RIGHT BREAST: ICD-10-CM

## 2024-06-06 DIAGNOSIS — D05.01 LOBULAR CARCINOMA IN SITU OF RIGHT BREAST: ICD-10-CM

## 2024-06-06 DIAGNOSIS — Z98.89 OTHER SPECIFIED POSTPROCEDURAL STATES: Chronic | ICD-10-CM

## 2024-06-06 DIAGNOSIS — Z00.8 ENCOUNTER FOR OTHER GENERAL EXAMINATION: ICD-10-CM

## 2024-06-06 DIAGNOSIS — Z98.890 OTHER SPECIFIED POSTPROCEDURAL STATES: Chronic | ICD-10-CM

## 2024-06-06 DIAGNOSIS — Z91.89 OTHER SPECIFIED PERSONAL RISK FACTORS, NOT ELSEWHERE CLASSIFIED: ICD-10-CM

## 2024-06-06 PROCEDURE — 77049 MRI BREAST C-+ W/CAD BI: CPT | Mod: 26

## 2024-06-06 PROCEDURE — C8937: CPT

## 2024-06-06 PROCEDURE — A9585: CPT

## 2024-06-06 PROCEDURE — C8908: CPT

## 2024-06-09 ENCOUNTER — NON-APPOINTMENT (OUTPATIENT)
Age: 49
End: 2024-06-09

## 2024-06-14 ENCOUNTER — APPOINTMENT (OUTPATIENT)
Dept: PLASTIC SURGERY | Facility: CLINIC | Age: 49
End: 2024-06-14
Payer: COMMERCIAL

## 2024-06-14 VITALS
OXYGEN SATURATION: 98 % | DIASTOLIC BLOOD PRESSURE: 69 MMHG | SYSTOLIC BLOOD PRESSURE: 112 MMHG | BODY MASS INDEX: 21.09 KG/M2 | TEMPERATURE: 98.2 F | HEIGHT: 63 IN | HEART RATE: 63 BPM | WEIGHT: 119 LBS

## 2024-06-14 DIAGNOSIS — D05.01 LOBULAR CARCINOMA IN SITU OF RIGHT BREAST: ICD-10-CM

## 2024-06-14 DIAGNOSIS — N60.81 OTHER BENIGN MAMMARY DYSPLASIAS OF RIGHT BREAST: ICD-10-CM

## 2024-06-14 DIAGNOSIS — N60.91 UNSPECIFIED BENIGN MAMMARY DYSPLASIA OF RIGHT BREAST: ICD-10-CM

## 2024-06-14 PROCEDURE — 99213 OFFICE O/P EST LOW 20 MIN: CPT

## 2024-06-14 NOTE — CONSULT LETTER
[Dear  ___] : Dear  [unfilled], [Courtesy Letter:] : I had the pleasure of seeing your patient, [unfilled], in my office today. [Please see my note below.] : Please see my note below. [Sincerely,] : Sincerely, [DrMarry  ___] : Dr. CHEN [FreeTextEntry3] : Susan M. Palleschi, MD, FACS\par  Division of Breast Surgery\par  Director, Breast Surgery\par  [facility]\par  [address] [place] [address]\par  [address]\[place] [address]\par  (Phone) [phone]\par  (Fax) [phone]

## 2024-06-14 NOTE — ASSESSMENT
[FreeTextEntry1] : History of right breast LCIS, ALH & FEA (5/2020) Clinical breast exam benign   1. Annual bilateral mammogram and breast ultrasound due 11/2024; Rx provided. 2. Follow up office visit due in 1 year. 3. Advised monthly self breast examinations and advised her to contact me if she has any concerns.  4. Bilateral breast MRI with IV contrast due 6/2025.

## 2024-06-14 NOTE — HISTORY OF PRESENT ILLNESS
[FreeTextEntry1] : Patient is a 49 year old female here today for a follow up visit.  She has a family history of breast cancer in her mother, diagnosed in her late 40's then had a recurrence at age 60. Her mother's genetic testing was negative.  She has a history of right breast LCIS, flat epithelial atypia and atypical lobular hyperplasia, s/p right surgical excisional biopsy with Dr. Mallory on 5/28/2020. Excisional margins at the time of surgery revealed ALH at the inferior margin.  Med Onc: Dr. Chao; Tamoxifen started 9/2020, planned to end 9/2025. She saw Shyanne 5/6/2024, next visit 11/2024. Tyrer-Cuzick Lifetime Risk: 75.4% 11/17/2023 Bilateral mammogram: A scar marker overlies the upper outer right breast, at the site of stable-appearing postsurgical scarring. 2 biopsy markers are again seen in the right breast. Previously described scattered and grouped calcifications in the right breast predominantly in the upper outer quadrant are again seen and are without significant change since 11/27/2019. Those which underwent prior stereotactic biopsy on 12/5/2019 and 1/15/2020 and yielded benign results. Benign-appearing calcifications elsewhere bilaterally are also without significant change. No suspicious masses or suspicious clusters of calcifications are seen bilaterally in either breast at this time. BI-RADS 2. Bilateral ultrasound: Right: -3:00 retroareolar region, cyst measuring 0.4 x 0.5 cm. -6:00 retroareolar region, cyst measuring 0.4 x 0.4 x 0.3 cm. -7:00, 5 cm from the nipple, probable cyst cluster/fibrocystic nodule measuring 0.6 x 0.3 x 0.7 cm, unchanged since 11/7/2022. An adjacent cyst measures 0.6 x 0.2 x 0.7 cm. -9:00 at the scar site, scarring is again seen, unchanged since 11/29/2021. -9:00, 2 cm from the nipple, bilobed cyst measuring 0.7 x 0.4 x 0.4 cm as measured by this examiner. -Lower inner quadrant, cyst measuring 0.6 x 0.5 x 0.3 cm. -Upper inner quadrant, sub-cm cyst. Left: -3:00, 6 cm from the nipple, sub-cm cyst. -7:00, 3 cm from the nipple, cyst measuring 0.4 x 0.4 x 0.3 cm. -Sub-cm cysts are seen elsewhere in all 4 quadrants. 6/10/2024 Bilateral MRI: Stable postsurgical changes in the right breast. There are stable susceptibility artifacts from biopsy markers in the breast. There are inspissated retroareolar ductal secretions. There are multiple scattered simple and proteinaceous cysts. There are scattered enhancing nonspecific foci. A 5 mm dominant focus of enhancement in the lower central right breast ([id number]) is stable since 5/2022 is without significant change since 2021, favoring a benign etiology.  There is no suspicious enhancement in the right breast. BI-RADS 2. She denies any current breast concerns

## 2024-06-14 NOTE — PHYSICAL EXAM

## 2024-11-22 ENCOUNTER — OUTPATIENT (OUTPATIENT)
Dept: OUTPATIENT SERVICES | Facility: HOSPITAL | Age: 49
LOS: 1 days | End: 2024-11-22
Payer: COMMERCIAL

## 2024-11-22 ENCOUNTER — RESULT REVIEW (OUTPATIENT)
Age: 49
End: 2024-11-22

## 2024-11-22 ENCOUNTER — APPOINTMENT (OUTPATIENT)
Dept: MAMMOGRAPHY | Facility: IMAGING CENTER | Age: 49
End: 2024-11-22
Payer: COMMERCIAL

## 2024-11-22 ENCOUNTER — APPOINTMENT (OUTPATIENT)
Dept: ULTRASOUND IMAGING | Facility: IMAGING CENTER | Age: 49
End: 2024-11-22
Payer: COMMERCIAL

## 2024-11-22 DIAGNOSIS — Z00.8 ENCOUNTER FOR OTHER GENERAL EXAMINATION: ICD-10-CM

## 2024-11-22 DIAGNOSIS — Z98.89 OTHER SPECIFIED POSTPROCEDURAL STATES: Chronic | ICD-10-CM

## 2024-11-22 DIAGNOSIS — Z98.890 OTHER SPECIFIED POSTPROCEDURAL STATES: Chronic | ICD-10-CM

## 2024-11-22 PROCEDURE — 76641 ULTRASOUND BREAST COMPLETE: CPT | Mod: 26,50

## 2024-11-22 PROCEDURE — 76641 ULTRASOUND BREAST COMPLETE: CPT

## 2024-11-22 PROCEDURE — 77063 BREAST TOMOSYNTHESIS BI: CPT | Mod: 26

## 2024-11-22 PROCEDURE — 77067 SCR MAMMO BI INCL CAD: CPT | Mod: 26

## 2024-11-22 PROCEDURE — 77063 BREAST TOMOSYNTHESIS BI: CPT

## 2024-11-22 PROCEDURE — 77067 SCR MAMMO BI INCL CAD: CPT

## 2024-12-13 ENCOUNTER — OUTPATIENT (OUTPATIENT)
Dept: OUTPATIENT SERVICES | Facility: HOSPITAL | Age: 49
LOS: 1 days | Discharge: HOME | End: 2024-12-13

## 2024-12-13 DIAGNOSIS — Z98.89 OTHER SPECIFIED POSTPROCEDURAL STATES: Chronic | ICD-10-CM

## 2024-12-13 DIAGNOSIS — C50.919 MALIGNANT NEOPLASM OF UNSPECIFIED SITE OF UNSPECIFIED FEMALE BREAST: ICD-10-CM

## 2024-12-13 DIAGNOSIS — Z98.890 OTHER SPECIFIED POSTPROCEDURAL STATES: Chronic | ICD-10-CM

## 2024-12-16 ENCOUNTER — APPOINTMENT (OUTPATIENT)
Dept: INTERNAL MEDICINE | Facility: CLINIC | Age: 49
End: 2024-12-16
Payer: COMMERCIAL

## 2024-12-16 ENCOUNTER — OUTPATIENT (OUTPATIENT)
Dept: OUTPATIENT SERVICES | Facility: HOSPITAL | Age: 49
LOS: 1 days | End: 2024-12-16

## 2024-12-16 VITALS
SYSTOLIC BLOOD PRESSURE: 100 MMHG | WEIGHT: 115 LBS | HEART RATE: 80 BPM | OXYGEN SATURATION: 99 % | HEIGHT: 63 IN | DIASTOLIC BLOOD PRESSURE: 62 MMHG | BODY MASS INDEX: 20.38 KG/M2

## 2024-12-16 DIAGNOSIS — N60.81 OTHER BENIGN MAMMARY DYSPLASIAS OF RIGHT BREAST: ICD-10-CM

## 2024-12-16 DIAGNOSIS — Z98.890 OTHER SPECIFIED POSTPROCEDURAL STATES: Chronic | ICD-10-CM

## 2024-12-16 DIAGNOSIS — Z98.89 OTHER SPECIFIED POSTPROCEDURAL STATES: Chronic | ICD-10-CM

## 2024-12-16 DIAGNOSIS — R53.83 OTHER FATIGUE: ICD-10-CM

## 2024-12-16 DIAGNOSIS — F41.9 ANXIETY DISORDER, UNSPECIFIED: ICD-10-CM

## 2024-12-16 DIAGNOSIS — Z00.00 ENCOUNTER FOR GENERAL ADULT MEDICAL EXAMINATION W/OUT ABNORMAL FINDINGS: ICD-10-CM

## 2024-12-16 LAB
ALBUMIN SERPL ELPH-MCNC: 4.4 G/DL
ALP BLD-CCNC: 46 U/L
ALT SERPL-CCNC: 18 U/L
ANION GAP SERPL CALC-SCNC: 12 MMOL/L
AST SERPL-CCNC: 18 U/L
BILIRUB SERPL-MCNC: 0.3 MG/DL
BUN SERPL-MCNC: 15 MG/DL
CALCIUM SERPL-MCNC: 9.2 MG/DL
CHLORIDE SERPL-SCNC: 104 MMOL/L
CHOLEST SERPL-MCNC: 155 MG/DL
CO2 SERPL-SCNC: 25 MMOL/L
CREAT SERPL-MCNC: 0.86 MG/DL
EGFR: 83 ML/MIN/1.73M2
GLUCOSE SERPL-MCNC: 91 MG/DL
HCT VFR BLD CALC: 39 %
HDLC SERPL-MCNC: 59 MG/DL
HGB BLD-MCNC: 12.7 G/DL
LDLC SERPL CALC-MCNC: 83 MG/DL
MCHC RBC-ENTMCNC: 30.2 PG
MCHC RBC-ENTMCNC: 32.6 G/DL
MCV RBC AUTO: 92.6 FL
NONHDLC SERPL-MCNC: 96 MG/DL
PLATELET # BLD AUTO: 184 K/UL
POTASSIUM SERPL-SCNC: 4.2 MMOL/L
PROT SERPL-MCNC: 6.3 G/DL
RBC # BLD: 4.21 M/UL
RBC # FLD: 13.2 %
SODIUM SERPL-SCNC: 141 MMOL/L
TRIGL SERPL-MCNC: 68 MG/DL
TSH SERPL-ACNC: 1.49 UIU/ML
WBC # FLD AUTO: 7.13 K/UL

## 2024-12-16 PROCEDURE — 99396 PREV VISIT EST AGE 40-64: CPT

## 2024-12-16 PROCEDURE — 90656 IIV3 VACC NO PRSV 0.5 ML IM: CPT

## 2024-12-16 PROCEDURE — G0008: CPT

## 2024-12-16 PROCEDURE — G0463: CPT

## 2024-12-17 LAB
ESTIMATED AVERAGE GLUCOSE: 97 MG/DL
HBA1C MFR BLD HPLC: 5 %

## 2024-12-18 ENCOUNTER — APPOINTMENT (OUTPATIENT)
Dept: HEMATOLOGY ONCOLOGY | Facility: CLINIC | Age: 49
End: 2024-12-18
Payer: COMMERCIAL

## 2024-12-18 VITALS
SYSTOLIC BLOOD PRESSURE: 95 MMHG | OXYGEN SATURATION: 98 % | HEART RATE: 63 BPM | BODY MASS INDEX: 20.35 KG/M2 | WEIGHT: 114.84 LBS | TEMPERATURE: 97.1 F | HEIGHT: 63 IN | DIASTOLIC BLOOD PRESSURE: 62 MMHG | RESPIRATION RATE: 18 BRPM

## 2024-12-18 DIAGNOSIS — D05.01 LOBULAR CARCINOMA IN SITU OF RIGHT BREAST: ICD-10-CM

## 2024-12-18 PROCEDURE — G2211 COMPLEX E/M VISIT ADD ON: CPT | Mod: NC

## 2024-12-18 PROCEDURE — 99214 OFFICE O/P EST MOD 30 MIN: CPT

## 2025-01-14 DIAGNOSIS — I10 ESSENTIAL (PRIMARY) HYPERTENSION: ICD-10-CM

## 2025-04-12 ENCOUNTER — TRANSCRIPTION ENCOUNTER (OUTPATIENT)
Age: 50
End: 2025-04-12

## 2025-04-12 DIAGNOSIS — J11.1 INFLUENZA DUE TO UNIDENTIFIED INFLUENZA VIRUS WITH OTHER RESPIRATORY MANIFESTATIONS: ICD-10-CM

## 2025-04-12 RX ORDER — OSELTAMIVIR PHOSPHATE 75 MG/1
75 CAPSULE ORAL TWICE DAILY
Qty: 1 | Refills: 0 | Status: ACTIVE | COMMUNITY
Start: 2025-04-12 | End: 1900-01-01

## 2025-04-14 ENCOUNTER — TRANSCRIPTION ENCOUNTER (OUTPATIENT)
Age: 50
End: 2025-04-14

## 2025-04-16 DIAGNOSIS — R92.30 DENSE BREASTS, UNSPECIFIED: ICD-10-CM

## 2025-06-13 ENCOUNTER — OUTPATIENT (OUTPATIENT)
Dept: OUTPATIENT SERVICES | Facility: HOSPITAL | Age: 50
LOS: 1 days | End: 2025-06-13
Payer: COMMERCIAL

## 2025-06-13 ENCOUNTER — APPOINTMENT (OUTPATIENT)
Dept: MRI IMAGING | Facility: IMAGING CENTER | Age: 50
End: 2025-06-13
Payer: COMMERCIAL

## 2025-06-13 DIAGNOSIS — Z91.89 OTHER SPECIFIED PERSONAL RISK FACTORS, NOT ELSEWHERE CLASSIFIED: ICD-10-CM

## 2025-06-13 DIAGNOSIS — R92.30 DENSE BREASTS, UNSPECIFIED: ICD-10-CM

## 2025-06-13 DIAGNOSIS — Z98.890 OTHER SPECIFIED POSTPROCEDURAL STATES: Chronic | ICD-10-CM

## 2025-06-13 DIAGNOSIS — D05.01 LOBULAR CARCINOMA IN SITU OF RIGHT BREAST: ICD-10-CM

## 2025-06-13 DIAGNOSIS — Z98.89 OTHER SPECIFIED POSTPROCEDURAL STATES: Chronic | ICD-10-CM

## 2025-06-13 DIAGNOSIS — Z80.3 FAMILY HISTORY OF MALIGNANT NEOPLASM OF BREAST: ICD-10-CM

## 2025-06-13 PROCEDURE — C8937: CPT

## 2025-06-13 PROCEDURE — 77049 MRI BREAST C-+ W/CAD BI: CPT | Mod: 26

## 2025-06-13 PROCEDURE — A9585: CPT

## 2025-06-13 PROCEDURE — C8908: CPT

## 2025-06-18 ENCOUNTER — APPOINTMENT (OUTPATIENT)
Dept: PLASTIC SURGERY | Facility: CLINIC | Age: 50
End: 2025-06-18

## 2025-07-24 ENCOUNTER — APPOINTMENT (OUTPATIENT)
Dept: HEMATOLOGY ONCOLOGY | Facility: CLINIC | Age: 50
End: 2025-07-24